# Patient Record
Sex: FEMALE | Race: WHITE | Employment: OTHER | URBAN - METROPOLITAN AREA
[De-identification: names, ages, dates, MRNs, and addresses within clinical notes are randomized per-mention and may not be internally consistent; named-entity substitution may affect disease eponyms.]

---

## 2019-06-16 ENCOUNTER — APPOINTMENT (OUTPATIENT)
Dept: GENERAL RADIOLOGY | Age: 74
DRG: 811 | End: 2019-06-16
Payer: MEDICARE

## 2019-06-16 ENCOUNTER — HOSPITAL ENCOUNTER (INPATIENT)
Age: 74
LOS: 4 days | Discharge: HOME | DRG: 811 | End: 2019-06-20
Attending: EMERGENCY MEDICINE | Admitting: INTERNAL MEDICINE
Payer: MEDICARE

## 2019-06-16 DIAGNOSIS — D62 ACUTE BLOOD LOSS ANEMIA: Primary | ICD-10-CM

## 2019-06-16 DIAGNOSIS — I50.9 ACUTE CONGESTIVE HEART FAILURE, UNSPECIFIED HEART FAILURE TYPE (HCC): ICD-10-CM

## 2019-06-16 PROBLEM — D64.9 ANEMIA: Status: ACTIVE | Noted: 2019-06-16

## 2019-06-16 LAB
ABO/RH: NORMAL
ANION GAP SERPL CALCULATED.3IONS-SCNC: 16 MMOL/L (ref 3–16)
ANISOCYTOSIS: ABNORMAL
ANTIBODY SCREEN: NORMAL
ATYPICAL LYMPHOCYTE RELATIVE PERCENT: 1 % (ref 0–6)
BASOPHILS ABSOLUTE: 0 K/UL (ref 0–0.2)
BASOPHILS RELATIVE PERCENT: 0 %
BLOOD BANK DISPENSE STATUS: NORMAL
BLOOD BANK PRODUCT CODE: NORMAL
BPU ID: NORMAL
BUN BLDV-MCNC: 7 MG/DL (ref 7–20)
CALCIUM SERPL-MCNC: 8.1 MG/DL (ref 8.3–10.6)
CHLORIDE BLD-SCNC: 100 MMOL/L (ref 99–110)
CO2: 23 MMOL/L (ref 21–32)
CREAT SERPL-MCNC: 0.5 MG/DL (ref 0.6–1.2)
DESCRIPTION BLOOD BANK: NORMAL
EOSINOPHILS ABSOLUTE: 0.1 K/UL (ref 0–0.6)
EOSINOPHILS RELATIVE PERCENT: 1 %
GFR AFRICAN AMERICAN: >60
GFR NON-AFRICAN AMERICAN: >60
GLUCOSE BLD-MCNC: 94 MG/DL (ref 70–99)
HCT VFR BLD CALC: 21.4 % (ref 36–48)
HEMOGLOBIN: 6.7 G/DL (ref 12–16)
LYMPHOCYTES ABSOLUTE: 1.3 K/UL (ref 1–5.1)
LYMPHOCYTES RELATIVE PERCENT: 15 %
MCH RBC QN AUTO: 29.8 PG (ref 26–34)
MCHC RBC AUTO-ENTMCNC: 31.1 G/DL (ref 31–36)
MCV RBC AUTO: 95.7 FL (ref 80–100)
MONOCYTES ABSOLUTE: 0.3 K/UL (ref 0–1.3)
MONOCYTES RELATIVE PERCENT: 4 %
NEUTROPHILS ABSOLUTE: 6.2 K/UL (ref 1.7–7.7)
NEUTROPHILS RELATIVE PERCENT: 79 %
PDW BLD-RTO: 18.9 % (ref 12.4–15.4)
PLATELET # BLD: 519 K/UL (ref 135–450)
PLATELET SLIDE REVIEW: ABNORMAL
PMV BLD AUTO: 7.7 FL (ref 5–10.5)
POLYCHROMASIA: ABNORMAL
POTASSIUM SERPL-SCNC: 4.4 MMOL/L (ref 3.5–5.1)
PRO-BNP: 1176 PG/ML (ref 0–124)
RBC # BLD: 2.23 M/UL (ref 4–5.2)
SODIUM BLD-SCNC: 139 MMOL/L (ref 136–145)
STOMATOCYTES: ABNORMAL
TARGET CELLS: ABNORMAL
TROPONIN: <0.01 NG/ML
WBC # BLD: 7.9 K/UL (ref 4–11)

## 2019-06-16 PROCEDURE — 99285 EMERGENCY DEPT VISIT HI MDM: CPT

## 2019-06-16 PROCEDURE — 93005 ELECTROCARDIOGRAM TRACING: CPT | Performed by: EMERGENCY MEDICINE

## 2019-06-16 PROCEDURE — 71046 X-RAY EXAM CHEST 2 VIEWS: CPT

## 2019-06-16 PROCEDURE — 85025 COMPLETE CBC W/AUTO DIFF WBC: CPT

## 2019-06-16 PROCEDURE — 86923 COMPATIBILITY TEST ELECTRIC: CPT

## 2019-06-16 PROCEDURE — 83540 ASSAY OF IRON: CPT

## 2019-06-16 PROCEDURE — 36430 TRANSFUSION BLD/BLD COMPNT: CPT

## 2019-06-16 PROCEDURE — 6370000000 HC RX 637 (ALT 250 FOR IP): Performed by: STUDENT IN AN ORGANIZED HEALTH CARE EDUCATION/TRAINING PROGRAM

## 2019-06-16 PROCEDURE — 6360000002 HC RX W HCPCS: Performed by: STUDENT IN AN ORGANIZED HEALTH CARE EDUCATION/TRAINING PROGRAM

## 2019-06-16 PROCEDURE — 1200000000 HC SEMI PRIVATE

## 2019-06-16 PROCEDURE — C9113 INJ PANTOPRAZOLE SODIUM, VIA: HCPCS | Performed by: STUDENT IN AN ORGANIZED HEALTH CARE EDUCATION/TRAINING PROGRAM

## 2019-06-16 PROCEDURE — 83880 ASSAY OF NATRIURETIC PEPTIDE: CPT

## 2019-06-16 PROCEDURE — 84443 ASSAY THYROID STIM HORMONE: CPT

## 2019-06-16 PROCEDURE — G0328 FECAL BLOOD SCRN IMMUNOASSAY: HCPCS

## 2019-06-16 PROCEDURE — 86900 BLOOD TYPING SEROLOGIC ABO: CPT

## 2019-06-16 PROCEDURE — P9016 RBC LEUKOCYTES REDUCED: HCPCS

## 2019-06-16 PROCEDURE — 2580000003 HC RX 258: Performed by: STUDENT IN AN ORGANIZED HEALTH CARE EDUCATION/TRAINING PROGRAM

## 2019-06-16 PROCEDURE — 80048 BASIC METABOLIC PNL TOTAL CA: CPT

## 2019-06-16 PROCEDURE — 84484 ASSAY OF TROPONIN QUANT: CPT

## 2019-06-16 PROCEDURE — 86901 BLOOD TYPING SEROLOGIC RH(D): CPT

## 2019-06-16 PROCEDURE — 36415 COLL VENOUS BLD VENIPUNCTURE: CPT

## 2019-06-16 PROCEDURE — 86850 RBC ANTIBODY SCREEN: CPT

## 2019-06-16 PROCEDURE — 83550 IRON BINDING TEST: CPT

## 2019-06-16 RX ORDER — LEVOTHYROXINE SODIUM 137 UG/1
137 TABLET ORAL DAILY
COMMUNITY

## 2019-06-16 RX ORDER — PANTOPRAZOLE SODIUM 40 MG/1
40 TABLET, DELAYED RELEASE ORAL DAILY
COMMUNITY

## 2019-06-16 RX ORDER — DICYCLOMINE HCL 20 MG
20 TABLET ORAL EVERY 6 HOURS PRN
Status: DISCONTINUED | OUTPATIENT
Start: 2019-06-16 | End: 2019-06-20 | Stop reason: HOSPADM

## 2019-06-16 RX ORDER — CITALOPRAM 20 MG/1
20 TABLET ORAL DAILY
Status: DISCONTINUED | OUTPATIENT
Start: 2019-06-17 | End: 2019-06-20 | Stop reason: HOSPADM

## 2019-06-16 RX ORDER — SIMVASTATIN 20 MG
20 TABLET ORAL NIGHTLY
Status: DISCONTINUED | OUTPATIENT
Start: 2019-06-16 | End: 2019-06-20 | Stop reason: HOSPADM

## 2019-06-16 RX ORDER — SODIUM CHLORIDE 0.9 % (FLUSH) 0.9 %
10 SYRINGE (ML) INJECTION EVERY 12 HOURS SCHEDULED
Status: DISCONTINUED | OUTPATIENT
Start: 2019-06-16 | End: 2019-06-20 | Stop reason: HOSPADM

## 2019-06-16 RX ORDER — CITALOPRAM 20 MG/1
20 TABLET ORAL DAILY
COMMUNITY

## 2019-06-16 RX ORDER — 0.9 % SODIUM CHLORIDE 0.9 %
250 INTRAVENOUS SOLUTION INTRAVENOUS ONCE
Status: DISCONTINUED | OUTPATIENT
Start: 2019-06-16 | End: 2019-06-18

## 2019-06-16 RX ORDER — ALPRAZOLAM 0.5 MG/1
0.5 TABLET ORAL NIGHTLY PRN
Status: DISCONTINUED | OUTPATIENT
Start: 2019-06-16 | End: 2019-06-20 | Stop reason: HOSPADM

## 2019-06-16 RX ORDER — LEVOTHYROXINE SODIUM 137 UG/1
137 TABLET ORAL DAILY
Status: DISCONTINUED | OUTPATIENT
Start: 2019-06-17 | End: 2019-06-20 | Stop reason: HOSPADM

## 2019-06-16 RX ORDER — ACETAMINOPHEN 325 MG/1
650 TABLET ORAL EVERY 4 HOURS PRN
Status: DISCONTINUED | OUTPATIENT
Start: 2019-06-16 | End: 2019-06-20 | Stop reason: HOSPADM

## 2019-06-16 RX ORDER — DICYCLOMINE HCL 20 MG
20 TABLET ORAL
COMMUNITY

## 2019-06-16 RX ORDER — SODIUM CHLORIDE 0.9 % (FLUSH) 0.9 %
10 SYRINGE (ML) INJECTION PRN
Status: DISCONTINUED | OUTPATIENT
Start: 2019-06-16 | End: 2019-06-20 | Stop reason: HOSPADM

## 2019-06-16 RX ORDER — SIMVASTATIN 20 MG
20 TABLET ORAL NIGHTLY
COMMUNITY

## 2019-06-16 RX ORDER — ALPRAZOLAM 0.5 MG/1
0.5 TABLET ORAL NIGHTLY PRN
Status: ON HOLD | COMMUNITY
End: 2021-10-11 | Stop reason: SDUPTHER

## 2019-06-16 RX ORDER — ONDANSETRON 2 MG/ML
4 INJECTION INTRAMUSCULAR; INTRAVENOUS EVERY 6 HOURS PRN
Status: DISCONTINUED | OUTPATIENT
Start: 2019-06-16 | End: 2019-06-20 | Stop reason: HOSPADM

## 2019-06-16 RX ADMIN — ALPRAZOLAM 0.5 MG: 0.5 TABLET ORAL at 23:46

## 2019-06-16 RX ADMIN — SIMVASTATIN 20 MG: 20 TABLET, FILM COATED ORAL at 22:16

## 2019-06-16 RX ADMIN — PANTOPRAZOLE SODIUM 8 MG/HR: 40 INJECTION, POWDER, FOR SOLUTION INTRAVENOUS at 22:16

## 2019-06-16 ASSESSMENT — ENCOUNTER SYMPTOMS
ABDOMINAL PAIN: 0
VOMITING: 0
NAUSEA: 0
SHORTNESS OF BREATH: 1
BLOOD IN STOOL: 1

## 2019-06-16 ASSESSMENT — PAIN SCALES - GENERAL: PAINLEVEL_OUTOF10: 0

## 2019-06-16 NOTE — ED NOTES
Bed: B19-19  Expected date:   Expected time:   Means of arrival:   Comments:  Fiordaliza Arredondo RN  06/16/19 1002

## 2019-06-16 NOTE — ED PROVIDER NOTES
[address] ENCOUNTER          ATTENDING PHYSICIAN NOTE     Date of evaluation: 6/16/2019    Chief Complaint     No chief complaint on file. History of Present Illness     Mimi Harper is a 68 y.o. female with a history of DM2, IPF, and recent LGIB who presents with dyspnea and weakness. Onset several weeks ago. Tired with minimal exertion. Had large volume bloody BMs with clots, colonoscopy Thursday with unknown results. BRBPR since resolved. Also with increased BLE swelling. Denies fever, chills, chest pain, abdominal pain, nausea, vomiting, melena, urinary symptoms, or other complaints. Review of Systems     Review of Systems   Constitutional: Negative for chills and fever. HENT: Negative for congestion and rhinorrhea. Respiratory: Positive for shortness of breath. Negative for cough. Cardiovascular: Negative for chest pain and palpitations. Gastrointestinal: Positive for blood in stool. Negative for abdominal pain, diarrhea, nausea and vomiting. Genitourinary: Negative for dysuria, frequency and urgency. Musculoskeletal: Negative for back pain and neck pain. Skin: Negative for rash and wound. Neurological: Positive for weakness. Negative for syncope and headaches. Psychiatric/Behavioral: Negative for agitation and confusion. Past Medical, Surgical, Family, and Social History     She has a past medical history of Diabetes mellitus ([facility] [address] Hyperlipidemia, Pulmonary fibrosis ([address] and Thyroid disease. She has a past surgical history that includes Upper gastrointestinal endoscopy and Colonoscopy. Her family history is not on file. She reports that she has never smoked. She has never used smokeless tobacco. She reports that she drinks alcohol. She reports that she does not use drugs. Medications     Previous Medications    ALPRAZOLAM (XANAX) 0.5 MG TABLET    Take 0.5 mg by mouth nightly as needed for Sleep.     ASPIRIN 81 MG TABLET    Take Results for orders placed or performed during the hospital encounter of 06/16/19   CBC Auto Differential   Result Value Ref Range    WBC 7.9 4.0 - 11.0 K/uL    RBC 2.23 (L) 4.00 - 5.20 M/uL    Hemoglobin 6.7 (LL) 12.0 - 16.0 g/dL    Hematocrit 21.4 (L) 36.0 - 48.0 %    MCV 95.7 80.0 - 100.0 fL    MCH 29.8 26.0 - 34.0 pg    MCHC 31.1 31.0 - 36.0 g/dL    RDW 18.9 (H) 12.4 - 15.4 %    Platelets 947 (H) 514 - 450 K/uL    MPV 7.7 5.0 - 10.5 fL    PLATELET SLIDE REVIEW Increased     Neutrophils % 79.0 %    Lymphocytes % 15.0 %    Monocytes % 4.0 %    Eosinophils % 1.0 %    Basophils % 0.0 %    Neutrophils # 6.2 1.7 - 7.7 K/uL    Lymphocytes # 1.3 1.0 - 5.1 K/uL    Monocytes # 0.3 0.0 - 1.3 K/uL    Eosinophils # 0.1 0.0 - 0.6 K/uL    Basophils # 0.0 0.0 - 0.2 K/uL    Atypical Lymphocytes Relative 1 0 - 6 %    Anisocytosis Occasional (A)     Polychromasia Occasional (A)     Stomatocytes Occasional (A)     Target Cells Occasional (A)    Basic Metabolic Panel   Result Value Ref Range    Sodium 139 136 - 145 mmol/L    Potassium 4.4 3.5 - 5.1 mmol/L    Chloride 100 99 - 110 mmol/L    CO2 23 21 - 32 mmol/L    Anion Gap 16 3 - 16    Glucose 94 70 - 99 mg/dL    BUN 7 7 - 20 mg/dL    CREATININE 0.5 (L) 0.6 - 1.2 mg/dL    GFR Non-African American >60 >60    GFR African American >60 >60    Calcium 8.1 (L) 8.3 - 10.6 mg/dL   Troponin   Result Value Ref Range    Troponin <0.01 <0.01 ng/mL   Brain Natriuretic Peptide   Result Value Ref Range    Pro-BNP 1,176 (H) 0 - 124 pg/mL   TYPE AND SCREEN   Result Value Ref Range    ABO/Rh B POS     Antibody Screen NEG    PREPARE RBC (CROSSMATCH), 1 Units   Result Value Ref Range    Product Code Blood Bank [id number]     Description Blood Bank Red Blood Cells, Leuko-reduced     Unit Number [id number]     Dispense Status Blood Bank issued        ED BEDSIDE ULTRASOUND:  N/A    RECENT VITALS:  BP: (!) 129/56, Temp: 98.1 °F (36.7 °C), Pulse: 79, Resp: 18, SpO2: 98 %     Procedures     N/A    MEDICAL DECISION MAKING / ASSESSMENT / Faridajossy Thorne is a 68 y.o. female with a history of DM2, IPF, and recent LGIB who presents with dyspnea and weakness. Symptoms and exam most consistent with symptomatic anemia 2/2 GIB. Other considerations include ADHF. Doubt ACS. Doubt PE. Doubt other emergent condition. Plan: ECG, labs as below, possible transfusion    ED Course     Nursing Notes, Past Medical Hx, Past Surgical Hx, Social Hx, Allergies, and Family Hx were reviewed. The patient was given the followingmedications:  Orders Placed This Encounter   Medications    0.9 % sodium chloride bolus       CONSULTS:  IP CONSULT TO HOSPITALIST    ED Course as of Jun 16 2029   Sun Jun 16, 2019   1913 NSR at 79bpm, no ST/T changes   EKG 12 Lead [AZ]   1928 Will transfuse   Hemoglobin Quant(!!): 6.7 [AZ]   1929 Small left pleural effusion. No acute pulmonary consolidation. XR CHEST STANDARD (2 VW) [AZ]   1940 BMP unremarkable    [AZ]   1940 Neg   Troponin: <0.01 [AZ]   1940 Elevated   Pro-BNP(!): 1,176 [AZ]   1959 Refusing rectal exam, will attempt to stool    [AZ]   2029 Suspect ABLA 2/2 LGIB and possible new HF. Admitted to medicine for further work-up and management.    [AZ]      ED Course User Index  [AZ] Casimiro Daley MD       Clinical Impression     1. Acute blood loss anemia    2. Acute congestive heart failure, unspecified heart failure type ([facility] [address]        Disposition     PATIENT REFERRED TO:  No follow-up provider specified.     DISCHARGEMEDICATIONS:  New Prescriptions    No medications on file       DISPOSITION       Casimiro Daley MD  06/16/19 2029

## 2019-06-16 NOTE — ED TRIAGE NOTES
Pt arrives with complaints of SOB for the last few weeks. States she is becoming increasingly short of breath with activity.

## 2019-06-16 NOTE — LETTER
Beneficiary Notification Letter     This [facility] Provider is Participating in an Innovative Payment and [address] from [employer] Palacios: [facility] is participating in a Medicare initiative called the [facility] for [address]. You are receiving this letter because your health care provider has identified you as a patient who is receiving care through this initiative. Health care providers participating in the [facility] [address], including [facility], will work with Medicare to improve care for patients. Your Medicare rights have not been changed. You still have all the same Medicare rights and protections, including the right to choose which hospital, doctor, or other health care provider you see. However, because [facility] [address] chose to participate in the [facility] [address], all Medicare beneficiaries who meet the eligibility criteria of this initiative will receive care under the initiative. If you do not wish to receive care under the [employer] [facility] [address], you must choose a health care provider that does not participate in this initiative for your care. Regardless of which health care provider you see, Medicare will continue to cover all of your medically necessary services. Bundled Payments for Care Improvement Advanced aims to help improve your care     The Bundled Payments [facility] [address] is an innovative Medicare initiative that encourages your doctors, hospitals, and other health care providers to work more closely together so you get better care during and following certain hospital stays.  In this initiative, doctors and hospitals may work closely with certain health care providers and 1-800- MEDICARE (7-526.922.1063). TTY users should call 4-549.816.1653. · To find a different doctor, visit Medicare's Physician Compare website, [employer], or call 1-800-MEDICARE (126 1952). TTY users should call 6-247.863.2612. · To find a different skilled nursing facility, visit [employer] website, https://www.[employer]/, or call 1-800-MEDICARE (1- 374.448.6036). TTY users should call 5-124.487.4328. · To find a different long term care hospital, visit [facility] [address] Compare website, [employer], or call 1-800- MEDICARE (903 9721). TTY users should call 0-537.533.4765. · To find a different inpatient rehabilitation facility, visit [facility] [address] Compare website, www.medicare.gov/ inpatientrehabilitation facilitycompare, or call 1-800-MEDICARE (4-213.899.7434). TTY users should call 2- 629.287.7435. · To find a different home health agency, visit [address] website, www.medicare.gov/homehealthcompare, or call 1-800-MEDICARE (5-713- 854-8616). TTY users should call 3-945.134.1424.

## 2019-06-17 LAB
ANION GAP SERPL CALCULATED.3IONS-SCNC: 12 MMOL/L (ref 3–16)
BASOPHILS ABSOLUTE: 0.1 K/UL (ref 0–0.2)
BASOPHILS RELATIVE PERCENT: 1.2 %
BLOOD BANK DISPENSE STATUS: NORMAL
BLOOD BANK PRODUCT CODE: NORMAL
BPU ID: NORMAL
BUN BLDV-MCNC: 7 MG/DL (ref 7–20)
CALCIUM SERPL-MCNC: 7.8 MG/DL (ref 8.3–10.6)
CHLORIDE BLD-SCNC: 104 MMOL/L (ref 99–110)
CO2: 21 MMOL/L (ref 21–32)
CREAT SERPL-MCNC: 0.5 MG/DL (ref 0.6–1.2)
DESCRIPTION BLOOD BANK: NORMAL
EKG ATRIAL RATE: 79 BPM
EKG DIAGNOSIS: NORMAL
EKG P AXIS: 26 DEGREES
EKG P-R INTERVAL: 146 MS
EKG Q-T INTERVAL: 394 MS
EKG QRS DURATION: 68 MS
EKG QTC CALCULATION (BAZETT): 451 MS
EKG R AXIS: 26 DEGREES
EKG T AXIS: 0 DEGREES
EKG VENTRICULAR RATE: 79 BPM
EOSINOPHILS ABSOLUTE: 0.2 K/UL (ref 0–0.6)
EOSINOPHILS RELATIVE PERCENT: 2 %
FERRITIN: 24.9 NG/ML (ref 15–150)
GFR AFRICAN AMERICAN: >60
GFR NON-AFRICAN AMERICAN: >60
GLUCOSE BLD-MCNC: 102 MG/DL (ref 70–99)
GLUCOSE BLD-MCNC: 114 MG/DL (ref 70–99)
GLUCOSE BLD-MCNC: 163 MG/DL (ref 70–99)
GLUCOSE BLD-MCNC: 90 MG/DL (ref 70–99)
GLUCOSE BLD-MCNC: 92 MG/DL (ref 70–99)
HCT VFR BLD CALC: 20.3 % (ref 36–48)
HCT VFR BLD CALC: 27.3 % (ref 36–48)
HEMOGLOBIN: 6.6 G/DL (ref 12–16)
HEMOGLOBIN: 8.9 G/DL (ref 12–16)
LV EF: 58 %
LVEF MODALITY: NORMAL
LYMPHOCYTES ABSOLUTE: 1.1 K/UL (ref 1–5.1)
LYMPHOCYTES RELATIVE PERCENT: 11.6 %
MCH RBC QN AUTO: 29.5 PG (ref 26–34)
MCHC RBC AUTO-ENTMCNC: 32.5 G/DL (ref 31–36)
MCV RBC AUTO: 90.6 FL (ref 80–100)
MONOCYTES ABSOLUTE: 0.9 K/UL (ref 0–1.3)
MONOCYTES RELATIVE PERCENT: 9.3 %
NEUTROPHILS ABSOLUTE: 7 K/UL (ref 1.7–7.7)
NEUTROPHILS RELATIVE PERCENT: 75.9 %
OCCULT BLOOD SCREENING: NORMAL
PDW BLD-RTO: 21.2 % (ref 12.4–15.4)
PERFORMED ON: ABNORMAL
PERFORMED ON: NORMAL
PLATELET # BLD: 481 K/UL (ref 135–450)
PMV BLD AUTO: 7.6 FL (ref 5–10.5)
POTASSIUM REFLEX MAGNESIUM: 4.6 MMOL/L (ref 3.5–5.1)
RBC # BLD: 3.01 M/UL (ref 4–5.2)
SODIUM BLD-SCNC: 137 MMOL/L (ref 136–145)
WBC # BLD: 9.3 K/UL (ref 4–11)

## 2019-06-17 PROCEDURE — 85014 HEMATOCRIT: CPT

## 2019-06-17 PROCEDURE — 1200000000 HC SEMI PRIVATE

## 2019-06-17 PROCEDURE — C9113 INJ PANTOPRAZOLE SODIUM, VIA: HCPCS | Performed by: STUDENT IN AN ORGANIZED HEALTH CARE EDUCATION/TRAINING PROGRAM

## 2019-06-17 PROCEDURE — 85025 COMPLETE CBC W/AUTO DIFF WBC: CPT

## 2019-06-17 PROCEDURE — 85018 HEMOGLOBIN: CPT

## 2019-06-17 PROCEDURE — 6360000002 HC RX W HCPCS: Performed by: INTERNAL MEDICINE

## 2019-06-17 PROCEDURE — 6360000002 HC RX W HCPCS: Performed by: STUDENT IN AN ORGANIZED HEALTH CARE EDUCATION/TRAINING PROGRAM

## 2019-06-17 PROCEDURE — 80048 BASIC METABOLIC PNL TOTAL CA: CPT

## 2019-06-17 PROCEDURE — 36415 COLL VENOUS BLD VENIPUNCTURE: CPT

## 2019-06-17 PROCEDURE — 93306 TTE W/DOPPLER COMPLETE: CPT

## 2019-06-17 PROCEDURE — P9016 RBC LEUKOCYTES REDUCED: HCPCS

## 2019-06-17 PROCEDURE — 6370000000 HC RX 637 (ALT 250 FOR IP): Performed by: STUDENT IN AN ORGANIZED HEALTH CARE EDUCATION/TRAINING PROGRAM

## 2019-06-17 PROCEDURE — 2580000003 HC RX 258: Performed by: STUDENT IN AN ORGANIZED HEALTH CARE EDUCATION/TRAINING PROGRAM

## 2019-06-17 PROCEDURE — 82728 ASSAY OF FERRITIN: CPT

## 2019-06-17 PROCEDURE — 86923 COMPATIBILITY TEST ELECTRIC: CPT

## 2019-06-17 PROCEDURE — 6370000000 HC RX 637 (ALT 250 FOR IP): Performed by: INTERNAL MEDICINE

## 2019-06-17 RX ORDER — NICOTINE POLACRILEX 4 MG
15 LOZENGE BUCCAL PRN
Status: DISCONTINUED | OUTPATIENT
Start: 2019-06-17 | End: 2019-06-20 | Stop reason: HOSPADM

## 2019-06-17 RX ORDER — FUROSEMIDE 10 MG/ML
20 INJECTION INTRAMUSCULAR; INTRAVENOUS ONCE
Status: COMPLETED | OUTPATIENT
Start: 2019-06-17 | End: 2019-06-17

## 2019-06-17 RX ORDER — DEXTROSE MONOHYDRATE 50 MG/ML
100 INJECTION, SOLUTION INTRAVENOUS PRN
Status: DISCONTINUED | OUTPATIENT
Start: 2019-06-17 | End: 2019-06-20 | Stop reason: HOSPADM

## 2019-06-17 RX ORDER — PANTOPRAZOLE SODIUM 40 MG/1
40 TABLET, DELAYED RELEASE ORAL
Status: DISCONTINUED | OUTPATIENT
Start: 2019-06-17 | End: 2019-06-20 | Stop reason: HOSPADM

## 2019-06-17 RX ORDER — LIDOCAINE 4 G/G
1 PATCH TOPICAL DAILY
Status: DISCONTINUED | OUTPATIENT
Start: 2019-06-17 | End: 2019-06-20 | Stop reason: HOSPADM

## 2019-06-17 RX ORDER — DEXTROSE MONOHYDRATE 25 G/50ML
12.5 INJECTION, SOLUTION INTRAVENOUS PRN
Status: DISCONTINUED | OUTPATIENT
Start: 2019-06-17 | End: 2019-06-20 | Stop reason: HOSPADM

## 2019-06-17 RX ADMIN — PANTOPRAZOLE SODIUM 40 MG: 40 TABLET, DELAYED RELEASE ORAL at 16:22

## 2019-06-17 RX ADMIN — PANTOPRAZOLE SODIUM 8 MG/HR: 40 INJECTION, POWDER, FOR SOLUTION INTRAVENOUS at 05:10

## 2019-06-17 RX ADMIN — Medication 10 ML: at 20:41

## 2019-06-17 RX ADMIN — ALPRAZOLAM 0.5 MG: 0.5 TABLET ORAL at 22:39

## 2019-06-17 RX ADMIN — Medication 10 ML: at 08:11

## 2019-06-17 RX ADMIN — FUROSEMIDE 20 MG: 10 INJECTION, SOLUTION INTRAMUSCULAR; INTRAVENOUS at 16:13

## 2019-06-17 RX ADMIN — LEVOTHYROXINE SODIUM 137 MCG: 137 TABLET ORAL at 05:04

## 2019-06-17 RX ADMIN — SIMVASTATIN 20 MG: 20 TABLET, FILM COATED ORAL at 20:40

## 2019-06-17 RX ADMIN — ACETAMINOPHEN 650 MG: 325 TABLET ORAL at 08:10

## 2019-06-17 RX ADMIN — CITALOPRAM HYDROBROMIDE 20 MG: 20 TABLET ORAL at 08:10

## 2019-06-17 ASSESSMENT — PAIN SCALES - GENERAL
PAINLEVEL_OUTOF10: 0
PAINLEVEL_OUTOF10: 3
PAINLEVEL_OUTOF10: 5
PAINLEVEL_OUTOF10: 0
PAINLEVEL_OUTOF10: 0

## 2019-06-17 ASSESSMENT — PAIN DESCRIPTION - PROGRESSION: CLINICAL_PROGRESSION: GRADUALLY WORSENING

## 2019-06-17 ASSESSMENT — PAIN DESCRIPTION - DESCRIPTORS: DESCRIPTORS: ACHING

## 2019-06-17 ASSESSMENT — PAIN DESCRIPTION - LOCATION: LOCATION: NECK

## 2019-06-17 ASSESSMENT — ENCOUNTER SYMPTOMS
GASTROINTESTINAL NEGATIVE: 1
SHORTNESS OF BREATH: 1

## 2019-06-17 ASSESSMENT — PAIN DESCRIPTION - ONSET: ONSET: ON-GOING

## 2019-06-17 ASSESSMENT — PAIN DESCRIPTION - PAIN TYPE: TYPE: ACUTE PAIN

## 2019-06-17 ASSESSMENT — PAIN DESCRIPTION - ORIENTATION: ORIENTATION: RIGHT

## 2019-06-17 ASSESSMENT — PAIN DESCRIPTION - FREQUENCY: FREQUENCY: CONTINUOUS

## 2019-06-17 NOTE — H&P
Internal Medicine  PGY 1  History & Physical      CC SOB, leg swelling     History Obtained From:  patient    HISTORY OF PRESENT ILLNESS:  Ms. Alinda Barthel is a 67 yo F with PMHx significant for recent LGIB, DM, HLD, HFrEF (ECHO 8/2018: EF 65-70%, G1DD), and hypothyroidism who presents to the ED complaining of SOB over the last several weeks which has progressively gotten worse over the last couple of days. Patient reports she has has a history of pulmonary fibrosis and is supposed to be on 2.5L at night, but reports that \"the machine hasn't been working right so I haven't been using it for a while. \" She states that getting up out of bed and ambulating across the room leads to severe shortness of breath. She now has to take frequent breaks to catch her breath while doing simple everyday activities around the house. She denies any orthopnea or PND. Additionally, she complains of lower extremity swelling which is chronic in nature, but has progressively gotten worse over the last 2 weeks. She reports that her podiatrist told her that the swelling was 2/2 diabetes. She denies CP, abdominal pain, nausea/vomiting, cough, fevers/chills. Interestingly, patient reports that she was experiencing BRBPR with dark clots from June 4th-June 8th. She has never experienced anything similar in the past. She reports that she had the urge to have a bowel movement but passed blood clots instead. She reported several more instances of rectal bleeding with clots which persisted for approximately 4 days. These episodes were associated with dizziness and light-headedness. Patient underwent c-scope and EGD on 6/13 which revealed 2 colon polyps, internal hemorrhoids, high grade esophogeal stricture and possible eosinophilic esophagitis. No obvious source of bleeding noted. Patient states that she has has no further episodes of BRBPR. She does report unintentional weight loss x 6 months, poor appetite and fatigue.  She has chronic dysphagia which is at baseline. No night sweats or recent travel. While in the ED, CBC showed a hemoglobin of 6.7 (down from 10.2 on June 6), Hct 21.4, BMP unremarkable. Troponin negative. BNP > 1100. CXR small left pleural effusion. Patient was typed and screened and given 1unit pRBCs. Past Medical History:        Diagnosis Date    Diabetes mellitus ([facility] [address]     Hyperlipidemia     Pulmonary fibrosis ([facility] [address]     Thyroid disease    ·     Past Surgical History:        Procedure Laterality Date    COLONOSCOPY      UPPER GASTROINTESTINAL ENDOSCOPY     ·     Medications Priorto Admission:    · Medications Prior to Admission: ALPRAZolam (XANAX) 0.5 MG tablet, Take 0.5 mg by mouth nightly as needed for Sleep. · aspirin 81 MG tablet, Take 81 mg by mouth daily  · dicyclomine (BENTYL) 20 MG tablet, Take 20 mg by mouth every 4-6 hours as needed  · citalopram (CELEXA) 20 MG tablet, Take 20 mg by mouth daily  · levothyroxine (SYNTHROID) 137 MCG tablet, Take 137 mcg by mouth Daily  · metFORMIN (GLUCOPHAGE) 1000 MG tablet, Take 1,000 mg by mouth 2 times daily (with meals)  · pantoprazole (PROTONIX) 40 MG tablet, Take 40 mg by mouth daily  · simvastatin (ZOCOR) 20 MG tablet, Take 20 mg by mouth nightly  · Ergocalciferol (VITAMIN D2 PO), Take 50,000 Units by mouth once a week    Allergies:  Lisinopril and Pcn [penicillins]    Social History:   · TOBACCO:   reports that she has never smoked. She has never used smokeless tobacco.  · ETOH:   reports that she drinks alcohol. · DRUGS : denied  · Patient currently lives at home   ·   Family History:   · History reviewed. No pertinent family history. Review of Systems   Constitutional: Positive for unexpected weight change. HENT: Negative. Respiratory: Positive for shortness of breath. Cardiovascular: Positive for leg swelling. Gastrointestinal: Positive for blood in stool. Negative for abdominal pain, nausea and vomiting. Genitourinary: Negative.     Musculoskeletal: Negative. Neurological: Positive for dizziness and light-headedness. ROS: A 10 point review of systems was conducted, significant findings as noted in HPI. Physical Exam   Constitutional: She is oriented to person, place, and time. She appears well-developed and well-nourished. No distress. HENT:   Head: Normocephalic. Eyes: Pupils are equal, round, and reactive to light. EOM are normal.   Neck: Normal range of motion. Cardiovascular: Normal rate and regular rhythm. Pulmonary/Chest: Effort normal.   Crackles heard within mid-lower lung bases . Abdominal: Soft. Bowel sounds are normal. She exhibits no distension. There is no tenderness. There is no guarding. Genitourinary:   Genitourinary Comments: Deferred ROSAS at this time. Musculoskeletal: Normal range of motion. She exhibits edema. 1+ pitting edema of bilateral lower extremities up to upper shins. Neurological: She is alert and oriented to person, place, and time. Skin: Skin is warm and dry. Psychiatric: She has a normal mood and affect. Physical exam:       Vitals:    06/16/19 2121   BP: 129/66   Pulse: 69   Resp: 18   Temp: 98.9 °F (37.2 °C)   SpO2: 94%       DATA:    Labs:  CBC:   Recent Labs     06/16/19 1856   WBC 7.9   HGB 6.7*   HCT 21.4*   *       BMP:   Recent Labs     06/16/19 1856      K 4.4      CO2 23   BUN 7   CREATININE 0.5*   GLUCOSE 94     LFT's: No results for input(s): AST, ALT, ALB, BILITOT, ALKPHOS in the last 72 hours. Troponin:   Recent Labs     06/16/19 1856   TROPONINI <0.01     BNP:No results for input(s): BNP in the last 72 hours. ABGs: No results for input(s): PHART, [id number], PO2ART in the last 72 hours. INR: No results for input(s): INR in the last 72 hours.     U/A:No results for input(s): NITRITE, COLORU, PHUR, LABCAST, WBCUA, RBCUA, MUCUS, TRICHOMONAS, YEAST, BACTERIA, CLARITYU, SPECGRAV, LEUKOCYTESUR, UROBILINOGEN, BILIRUBINUR, BLOODU, GLUCOSEU, AMORPHOUS in the last home statin     Anxiety, sleep  - Cont home xanax nightly        Will discuss with attending physician Dr. Una Connelly Status:Full code  FEN: carb control, NPO at midnight   PPX: Holding AC/AP for possible GIB  DISPO: JENNIFER Owens MD  6/16/2019,  9:27 PM

## 2019-06-17 NOTE — CONSULTS
Clinical Pharmacy Progress Note    Admit date: 6/16/2019     Pharmacy has been asked by Dr. Claudio Alanis to assist with obtaining home medication information. Assessment/Plan:    1. Home Medication information:    · Home Medication List in Epic is complete. Source of information is patient interview. · No changes made to home medication list completed by RN upon admission.  Patient does tell me that she was recently transitioned from imipramine to citalopram, which is reflected in home medication list.     Please call with questions  Thank you for consulting pharmacy --   Nanette Chapman PharmD, BCPS  6/17/2019 5:04 PM  Wireless: [id number]

## 2019-06-17 NOTE — PROGRESS NOTES
Progress Note    Admit Date: 6/16/2019  Day: 1  Diet: Diet NPO, After Midnight Exceptions are: Ice Chips    CC: SOB, leg swelling     Interval history: Post transfusion H&H 6.6 (was 6.7 on admission). Transfused an additional unit of pRBCs. Awaiting post-transfusion H&H. Patient reports that she is feeling better this morning and does not feel as winded with ambulation. Denies CP, abdominal pain, nausea/vomiting. No blood in stool. HPI: Ms. Tung Valderrama is a 67 yo F with PMHx significant for recent LGIB, DM, HLD, HFrEF (ECHO 8/2018: EF 65-70%, G1DD), and hypothyroidism who presents to the ED complaining of SOB.  Hemoglobin 6.7 in setting of recent GIB.            Medications:     Scheduled Meds:   sodium chloride  250 mL Intravenous Once    citalopram  20 mg Oral Daily    levothyroxine  137 mcg Oral Daily    simvastatin  20 mg Oral Nightly    sodium chloride flush  10 mL Intravenous 2 times per day     Continuous Infusions:   pantoprozole (PROTONIX) infusion 8 mg/hr (06/16/19 2216)     PRN Meds:ALPRAZolam, dicyclomine, sodium chloride flush, acetaminophen, ondansetron    Objective:   Vitals:   T-max:  Patient Vitals for the past 8 hrs:   BP Temp Temp src Pulse Resp SpO2 Height Weight   06/17/19 0252 113/62 98.5 °F (36.9 °C) Oral 79 18 98 % -- --   06/17/19 0242 114/66 98.7 °F (37.1 °C) Oral 79 18 98 % -- --   06/17/19 0200 116/63 98.6 °F (37 °C) Oral 80 18 99 % -- --   06/17/19 0023 (!) 115/58 98.9 °F (37.2 °C) Oral 84 18 98 % -- --   06/16/19 2136 -- -- -- -- -- -- 5' 7\" (1.702 m) 189 lb 13.1 oz (86.1 kg)   06/16/19 2121 129/66 98.9 °F (37.2 °C) Oral 69 18 94 % -- --   06/16/19 2050 125/71 -- -- 72 18 96 % -- --   06/16/19 2040 (!) 128/57 -- -- 77 16 99 % -- --   06/16/19 2034 -- 98.6 °F (37 °C) -- -- -- -- -- --   06/16/19 2031 (!) 121/54 -- Oral 78 16 100 % -- --       Intake/Output Summary (Last 24 hours) at 6/17/2019 0413  Last data filed at 6/17/2019 0057  Gross per 24 hour   Intake 240 ml   Output 550 ml Assessment/Plan:   Ms. Jason Vogt is a 67 yo F with PMHx significant for recent LGIB, DM, HLD, HFrEF (ECHO 8/2018: EF 65-70%, G1DD), and hypothyroidism who presents to the ED complaining of SOB.      Shortness of breath likely 2/2 acute blood loss anemia- Patient presents with progressively worsening SOB over the last week. Hemoglobin 6.7 on admission- down from 10.2 on 6/6/19. MCV 95. Recent EGD and c-scope performed on 6/13 without obvious source of bleeding. Received 2 unit of pRBCs over night. Will follow-up with H&H.   - Consult GI for further evaluation  - Follow-up post transfusion H&H, then Q6h after  - Transfuse < 7, be mindful of fluid status, may need lasix in between transfusions  - NPO until GI sees patient. - Cont protonix gtt  - Holding off on IVFs in setting of crackles, leg swelling and SOB  - Tele  - Follow-up iron studies   - CT A/P?     Concern for GIB- Patient with recent complaints of BRBPR with associated dark clots. No recent bleeding since approximately June 8th. Underwent c-scope and [facility]GD [facility] on 6/13 which revealed colon polyps, internal hemorrhoids, high grade distal esophogeal stricture, and possible eosonophilic esophagitis. - Follow-up FOBT  - See plan above   - Can start diet if GI is on board     HFpEF- ECHO in 2018 demonstrated EF 65-70%, mild LVH, G1DD. Not on lasix at home. Complains of SOB and pitting edema of bilateral lower extremities.  JVD could not be assessed on physical exam. Anemia may be throwing patient into HF exacerbation.   - Repeat ECHO  - Holding of on fluid resuscitation as patient has crackles on exam    - Be mindful of fluid status in setting of blood transfusions, may need lasix     Hypothyroidism   - Cont home synthroid   - TSH    DM  - Hold home metformin  - Will initiate insulin if needed   - Hypoglycemic protocol   - Accu checks      HLD  - Cont home statin      Anxiety, sleep  - Cont home xanax nightly         Will discuss with attending physician Dr. Jordin Mauricio     Code Status:Full code  FEN: carb control, NPO at midnight   PPX: Holding AC/AP for possible GIB  DISPO: JENNIFER Farah, PGY-1  06/17/19  4:13 AM      Patient seen and examined, plan of care discussed with residents. Agree with their assessment and plan with following addendum:    Patient declined colonoscopy. ECHO and physical exam consistent with acute diast CHF. Will give dose of lasix and reassess response. Never been on diuretics. Dyspnea is multifactorial with her baseline pulmonary fibrosis. Anemia was corrected with transfusion. PT/OT.      Paulino Mcintyre

## 2019-06-17 NOTE — PROGRESS NOTES
4 Eyes Admission Assessment     I agree as the admission nurse that 2 RN's have performed a thorough Head to Toe Skin Assessment on the patient. ALL assessment sites listed below have been assessed on admission. Areas assessed by both nurses:   [x]   Head, Face, and Ears   [x]   Shoulders, Back, and Chest  [x]   Arms, Elbows, and Hands   [x]   Coccyx, Sacrum, and Ischum  [x]   Legs, Feet, and Heels        Does the Patient have Skin Breakdown?   No         Geoffrey Prevention initiated:  No   Wound Care Orders initiated:  No      [employer] nurse consulted for Pressure Injury (Stage 3,4, Unstageable, DTI, NWPT, and Complex wounds):  No      Nurse 1 eSignature: Electronically signed by Armani Rodriguez RN on 6/16/19 at 10:19 PM    **SHARE this note so that the co-signing nurse is able to place an eSignature**    Nurse 2 eSignature: Electronically signed by Giovani Saenz RN on 6/16/19 at 10:21 PM

## 2019-06-17 NOTE — PLAN OF CARE
Problem: Falls - Risk of:  Goal: Will remain free from falls  Description  Will remain free from falls  Outcome: Ongoing  Note:   Patient scores as a fall risk, falls precautions in place r/t weakness and IV pole. Patient calls out appropriately. Will monitor.

## 2019-06-17 NOTE — CONSULTS
Consult Note      Sharlene [place]  1945    Consultant: Monica Campbell  Reason for Consult:  GI bleed  Requesting Physician:  Miguelina Babcock    CHIEF COMPLAINT:  bleeding    History Obtained From:  patient, electronic medical record    HISTORY OF PRESENT ILLNESS:                The patient is a 68 y.o. female with significant past medical history of colon polyps who presents with weakness. I was told patient had hematochezia which she denies. Just weakness like she never recovered from previous bleeding prior to last EGD/colon. Past Medical History:        Diagnosis Date    [facility]     Hyperlipidemia     Pulmonary fibrosis ([facility]     Thyroid disease      Past Surgical History:        Procedure Laterality Date    COLONOSCOPY      UPPER GASTROINTESTINAL ENDOSCOPY       Medications at Home:  Medications Prior to Admission: ALPRAZolam (XANAX) 0.5 MG tablet, Take 0.5 mg by mouth nightly as needed for Sleep.   aspirin 81 MG tablet, Take 81 mg by mouth daily  dicyclomine (BENTYL) 20 MG tablet, Take 20 mg by mouth every 4-6 hours as needed  citalopram (CELEXA) 20 MG tablet, Take 20 mg by mouth daily  levothyroxine (SYNTHROID) 137 MCG tablet, Take 137 mcg by mouth Daily  metFORMIN (GLUCOPHAGE) 1000 MG tablet, Take 1,000 mg by mouth 2 times daily (with meals)  pantoprazole (PROTONIX) 40 MG tablet, Take 40 mg by mouth daily  simvastatin (ZOCOR) 20 MG tablet, Take 20 mg by mouth nightly  Ergocalciferol (VITAMIN D2 PO), Take 50,000 Units by mouth once a week  Current Medications:    Current Facility-Administered Medications: glucose (GLUTOSE) 40 % oral gel 15 g, 15 g, Oral, PRN  dextrose 50 % IV solution, 12.5 g, Intravenous, PRN  glucagon (rDNA) injection 1 mg, 1 mg, Intramuscular, PRN  dextrose 5 % solution, 100 mL/hr, Intravenous, PRN  lidocaine 4 % external patch 1 patch, 1 patch, Transdermal, Daily  polyethylene glycol (GoLYTELY) solution 4,000 mL, 4,000 mL, Oral, Once  0.9 % sodium chloride bolus, 250 mL, Intravenous, Once  ALPRAZolam (XANAX) tablet 0.5 mg, 0.5 mg, Oral, Nightly PRN  citalopram (CELEXA) tablet 20 mg, 20 mg, Oral, Daily  dicyclomine (BENTYL) tablet 20 mg, 20 mg, Oral, Q6H PRN  levothyroxine (SYNTHROID) tablet 137 mcg, 137 mcg, Oral, Daily  simvastatin (ZOCOR) tablet 20 mg, 20 mg, Oral, Nightly  sodium chloride flush 0.9 % injection 10 mL, 10 mL, Intravenous, 2 times per day  sodium chloride flush 0.9 % injection 10 mL, 10 mL, Intravenous, PRN  acetaminophen (TYLENOL) tablet 650 mg, 650 mg, Oral, Q4H PRN  ondansetron (ZOFRAN) injection 4 mg, 4 mg, Intravenous, Q6H PRN  Allergies:  Lisinopril and Pcn [penicillins]    Social History:    TOBACCO:   reports that she has never smoked. She has never used smokeless tobacco.    Family History:   History reviewed. No pertinent family history. REVIEW OF SYSTEMS:    A comprehensive 12 point review of systems is negative except as documented above. PHYSICAL EXAM:      Vitals:    BP (!) 146/70   Pulse 82   Temp 98.6 °F (37 °C) (Oral)   Resp 18   Ht 5' 7\" (1.702 m)   Wt 189 lb 13.1 oz (86.1 kg)   SpO2 92%   BMI 29.73 kg/m²     General: No acute distress  HEENT: PERRL, EOMI, oral mucosa moist and intact, no sclericterus  Neck: no thyromegaly  Lymphatic: no cervical or supraclavicular lymphadenopathy  Heart: no m/r/g; +s1/s2 rrr  Lungs: CTA bilaterally  Abdomen: soft, nt, nd +BS  Extremities: 2+pulses, no edema  Skin: no rashes or lesions  Neuro: a&o x 3; no gross deficit  DATA:    Recent Labs     06/16/19 1856 06/17/19  0138   WBC 7.9  --    HGB 6.7* 6.6*   HCT 21.4* 20.3*   MCV 95.7  --    *  --      Recent Labs     06/16/19 1856 06/17/19  0732    137   K 4.4 4.6    104   CO2 23 21   BUN 7 7   CREATININE 0.5* 0.5*     No results for input(s): AST, ALT, ALB, BILIDIR, BILITOT, ALKPHOS in the last 72 hours. No results for input(s): LIPASE, AMYLASE in the last 72 hours. No results for input(s): PROT, INR in the last 72 hours.   No noted.            IMPRESSION/RECOMMENDATIONS:      Bleeding several weeks ago which was already evaluated. Just felt weak and got transfused. Now feels better. Follow up with Dr. Nanda Alanis at [address]  Call back with questions or concerns    Thank you for allowing me to participate in [facility] care. Grace Dalal.  Kelsea Polk MD

## 2019-06-17 NOTE — PROGRESS NOTES
Patient here from ED. Admitted to room 4322. VSS on RA. Blood transfusing upon arrival to floor. Skin assessed with RUTH ANN Shah. No skin impairment noted. Admission completed by this RN. Oriented to room, surroundings, and routine. To be NPO at midnight for GI consult in AM.  General diet ordered per Dr. Steve Silva, patient eating boxed lunch. Protonix gtt to be started following new IV placement.

## 2019-06-17 NOTE — PLAN OF CARE
Problem: Falls - Risk of:  Goal: Will remain free from falls  Description  Will remain free from falls  Outcome: Ongoing  Note:   Pt is alert and oriented x 4. No attempts to get up on own. Chair alarm on, call light within reach. Pt calls appropriately for assistance. Will continue to monitor. Problem: Pain:  Goal: Control of acute pain  Description  Control of acute pain  Outcome: Ongoing  Note:   Pt c/o neck pain. Administered tylenol and lidoderm patch and pt reports improvement. Will continue to monitor. Problem: OXYGENATION/RESPIRATORY FUNCTION  Goal: Patient will maintain patent airway  Outcome: Ongoing  Note:   SpO2 90-92% this morning and afternoon. Pt very dyspneic with ambulation to and from bathroom. This evening breathing is improved and SpO2 94%. Problem: HEMODYNAMIC STATUS  Goal: Patient has stable vital signs and fluid balance  Outcome: Ongoing  Note:   Vitals stable, SR on tele. Received lasix 20 mg IV x 1 this evening, pt voiding very well per hat. 1850 ml out since administration. +1 edema generalized. Breathing improved.

## 2019-06-17 NOTE — PROGRESS NOTES
Blood completed at this time. VSS on RA. No S/S of reaction. Post transfusion H and H to be drawn in 2 hours.

## 2019-06-17 NOTE — CARE COORDINATION
Case Management Assessment           Initial Evaluation                Date / Time of Evaluation: 6/17/2019 11:50 AM                 Assessment Completed by: Carlos Dawson    Patient Name: Alexandra Vora     YOB: 1945  Diagnosis: Anemia [D64.9]  Anemia [D64.9]     Date / Time: 6/16/2019  6:27 PM    Patient Admission Status: Inpatient    If patient is discharged prior to next notation, then this note serves as note for discharge by case management. Current PCP: No primary care provider on file. Clinic Patient: No    Chart Reviewed: Yes  Patient/ Family Interviewed: Yes    Initial assessment completed at bedside with: patient at bedside    Hospitalization in the last 30 days: No    Emergency Contacts:  Extended Emergency Contact Information  Primary Emergency Contact: Mitchell Morris  Home Phone: [phone]  Relation: Child  Secondary Emergency Contact: Kenji Montague  Home Phone: [phone]  Relation: Child   needed? No    Advance Directives:   Code Status: Full Code    Healthcare Power of : No  Agent:   Contact Number:     Copy present: No     In paper Chart: No    Scanned into EMR No    Financial  Payor: MEDICARE / Plan: MEDICARE PART A AND B / Product Type: *No Product type* /     Pre-cert required for SNF: No    Pharmacy    [facility], [address] [place] [address]. [facility] [address]  Phone: [phone] Fax: [phone]      Potential assistance Purchasing Medications: Potential Assistance Purchasing Medications: No  Does Patient want to participate in local refill/ meds to beds program?: No    Meds To Beds General Rules:  1. Can ONLY be done Monday- Friday between 8:30am-5pm  2. Prescription(s) must be in pharmacy by 3pm to be filled same day  3. Copy of patient's insurance/ prescription drug card and patient face sheet must be sent along with the prescription(s)  4.  Cost of Rx cannot be added to hospital bill. If financial assistance is needed, please contact unit  or ;  or  CANNOT provide pharmacy voucher for patients co-pays  5. Patients can then  the prescription on their way out of the hospital at discharge, or pharmacy can deliver to the bedside if staff is available. (payment due at time of pick-up or delivery - cash, check, or card accepted)     Able to afford home medications/ co-pay costs: Yes    ADLS  Support Systems: Children, Family Members, Friends/Neighbors    PT AM-PAC:   /24  OT AM-PAC:   /24    [place]: Home alone, no current [facility] services  Steps: 6    Plans to RETURN to current housing: Yes  Barriers to RETURNING to current housing: none reported per patient    Mariel Tobar  Currently ACTIVE with [address]: No, has used [facility] in the past per patient which is [address]: Not Applicable    Currently ACTIVE with [facility]: No  Passport/ Waiver: No  Passport/ Waiver Services: Not Applicable    :  Phone:       [email or web address]  [facility] Provider:  Declined by patient  Equipment:     Home Oxygen and [address] prior to admission: Yes  Patience Quinn 262: [facility]   Phone: [phone]    Informed of need to bring portable home O2 tank on day of DISCHARGE for nursing to connect prior to leaving: Yes  Verbalized agreement/Understanding: Yes  Person to bring portable tank at discharge: daughter will bring if needed at discharge. Patient currently on room air, per [facility] patient has orders for 2LPM continuously. Patient reports she does not wear her oxygen all the time at home.     Dialysis  Active with HD/PD prior to admission: No  Nephrologist:     HD Center:  Not Applicable    DISCHARGE PLAN:  Disposition: Home with [facility] for discharge: family     Factors facilitating

## 2019-06-17 NOTE — PROGRESS NOTES
2nd unit of PRBCs complete at this time. To repeat H/H again in 2 hours at 0700. VSS on RA. No S/S of active bleeding. Awaiting stool to obtain sample. Patient stated she feels less SOB following second unit of blood.

## 2019-06-18 LAB
ANION GAP SERPL CALCULATED.3IONS-SCNC: 10 MMOL/L (ref 3–16)
BASOPHILS ABSOLUTE: 0.1 K/UL (ref 0–0.2)
BASOPHILS RELATIVE PERCENT: 1 %
BUN BLDV-MCNC: 7 MG/DL (ref 7–20)
CALCIUM SERPL-MCNC: 7.8 MG/DL (ref 8.3–10.6)
CHLORIDE BLD-SCNC: 104 MMOL/L (ref 99–110)
CO2: 27 MMOL/L (ref 21–32)
CREAT SERPL-MCNC: 0.5 MG/DL (ref 0.6–1.2)
EOSINOPHILS ABSOLUTE: 0.2 K/UL (ref 0–0.6)
EOSINOPHILS RELATIVE PERCENT: 3.2 %
GFR AFRICAN AMERICAN: >60
GFR NON-AFRICAN AMERICAN: >60
GLUCOSE BLD-MCNC: 105 MG/DL (ref 70–99)
GLUCOSE BLD-MCNC: 106 MG/DL (ref 70–99)
GLUCOSE BLD-MCNC: 125 MG/DL (ref 70–99)
GLUCOSE BLD-MCNC: 94 MG/DL (ref 70–99)
HCT VFR BLD CALC: 26 % (ref 36–48)
HEMOGLOBIN: 8.2 G/DL (ref 12–16)
IRON SATURATION: 10 % (ref 15–50)
IRON: 25 UG/DL (ref 37–145)
LYMPHOCYTES ABSOLUTE: 1 K/UL (ref 1–5.1)
LYMPHOCYTES RELATIVE PERCENT: 18.6 %
MCH RBC QN AUTO: 29.4 PG (ref 26–34)
MCHC RBC AUTO-ENTMCNC: 31.7 G/DL (ref 31–36)
MCV RBC AUTO: 93 FL (ref 80–100)
MONOCYTES ABSOLUTE: 0.6 K/UL (ref 0–1.3)
MONOCYTES RELATIVE PERCENT: 11.2 %
NEUTROPHILS ABSOLUTE: 3.5 K/UL (ref 1.7–7.7)
NEUTROPHILS RELATIVE PERCENT: 66 %
PDW BLD-RTO: 21.9 % (ref 12.4–15.4)
PERFORMED ON: ABNORMAL
PLATELET # BLD: 361 K/UL (ref 135–450)
PMV BLD AUTO: 8.1 FL (ref 5–10.5)
POTASSIUM REFLEX MAGNESIUM: 4.1 MMOL/L (ref 3.5–5.1)
RBC # BLD: 2.8 M/UL (ref 4–5.2)
SODIUM BLD-SCNC: 141 MMOL/L (ref 136–145)
TOTAL IRON BINDING CAPACITY: 254 UG/DL (ref 260–445)
TSH REFLEX: 1.79 UIU/ML (ref 0.27–4.2)
WBC # BLD: 5.3 K/UL (ref 4–11)

## 2019-06-18 PROCEDURE — 93005 ELECTROCARDIOGRAM TRACING: CPT | Performed by: STUDENT IN AN ORGANIZED HEALTH CARE EDUCATION/TRAINING PROGRAM

## 2019-06-18 PROCEDURE — 85025 COMPLETE CBC W/AUTO DIFF WBC: CPT

## 2019-06-18 PROCEDURE — 2580000003 HC RX 258: Performed by: STUDENT IN AN ORGANIZED HEALTH CARE EDUCATION/TRAINING PROGRAM

## 2019-06-18 PROCEDURE — 6360000002 HC RX W HCPCS: Performed by: STUDENT IN AN ORGANIZED HEALTH CARE EDUCATION/TRAINING PROGRAM

## 2019-06-18 PROCEDURE — 36415 COLL VENOUS BLD VENIPUNCTURE: CPT

## 2019-06-18 PROCEDURE — 6370000000 HC RX 637 (ALT 250 FOR IP): Performed by: INTERNAL MEDICINE

## 2019-06-18 PROCEDURE — 6360000002 HC RX W HCPCS: Performed by: INTERNAL MEDICINE

## 2019-06-18 PROCEDURE — 1200000000 HC SEMI PRIVATE

## 2019-06-18 PROCEDURE — 97535 SELF CARE MNGMENT TRAINING: CPT

## 2019-06-18 PROCEDURE — 97116 GAIT TRAINING THERAPY: CPT

## 2019-06-18 PROCEDURE — 80048 BASIC METABOLIC PNL TOTAL CA: CPT

## 2019-06-18 PROCEDURE — 97161 PT EVAL LOW COMPLEX 20 MIN: CPT

## 2019-06-18 PROCEDURE — 97530 THERAPEUTIC ACTIVITIES: CPT

## 2019-06-18 PROCEDURE — 97166 OT EVAL MOD COMPLEX 45 MIN: CPT

## 2019-06-18 PROCEDURE — 6370000000 HC RX 637 (ALT 250 FOR IP): Performed by: STUDENT IN AN ORGANIZED HEALTH CARE EDUCATION/TRAINING PROGRAM

## 2019-06-18 RX ORDER — FUROSEMIDE 10 MG/ML
20 INJECTION INTRAMUSCULAR; INTRAVENOUS ONCE
Status: COMPLETED | OUTPATIENT
Start: 2019-06-18 | End: 2019-06-18

## 2019-06-18 RX ORDER — POTASSIUM CHLORIDE 20 MEQ/1
20 TABLET, EXTENDED RELEASE ORAL ONCE
Status: COMPLETED | OUTPATIENT
Start: 2019-06-18 | End: 2019-06-18

## 2019-06-18 RX ADMIN — Medication 10 ML: at 20:24

## 2019-06-18 RX ADMIN — Medication 10 ML: at 08:02

## 2019-06-18 RX ADMIN — ALPRAZOLAM 0.5 MG: 0.5 TABLET ORAL at 23:59

## 2019-06-18 RX ADMIN — FUROSEMIDE 20 MG: 10 INJECTION, SOLUTION INTRAMUSCULAR; INTRAVENOUS at 10:22

## 2019-06-18 RX ADMIN — FUROSEMIDE 20 MG: 10 INJECTION, SOLUTION INTRAMUSCULAR; INTRAVENOUS at 17:43

## 2019-06-18 RX ADMIN — PANTOPRAZOLE SODIUM 40 MG: 40 TABLET, DELAYED RELEASE ORAL at 06:45

## 2019-06-18 RX ADMIN — SIMVASTATIN 20 MG: 20 TABLET, FILM COATED ORAL at 20:24

## 2019-06-18 RX ADMIN — CITALOPRAM HYDROBROMIDE 20 MG: 20 TABLET ORAL at 08:01

## 2019-06-18 RX ADMIN — POTASSIUM CHLORIDE 20 MEQ: 20 TABLET, EXTENDED RELEASE ORAL at 16:07

## 2019-06-18 RX ADMIN — LEVOTHYROXINE SODIUM 137 MCG: 137 TABLET ORAL at 06:45

## 2019-06-18 ASSESSMENT — PAIN SCALES - GENERAL
PAINLEVEL_OUTOF10: 0

## 2019-06-18 ASSESSMENT — ENCOUNTER SYMPTOMS
SHORTNESS OF BREATH: 1
GASTROINTESTINAL NEGATIVE: 1

## 2019-06-18 NOTE — CARE COORDINATION
[facility]  CHF Team Conference Note      Patient ID: Fransisco Valles 68 y.o. 1945    Admission Date: 6/16/2019   Admission Weight: 189 lb  Discharge Date: 6/19/2019  Discharge Weight: 176 lb    30 Day Readmit? No    Pt History and Precipitating Cause of Decompensation:   PMHx significant for recent LGIB, DM, HLD, HFrEF (ECHO 8/2018: EF 65-70%, G1DD), and hypothyroidism who presents to the ED complaining of SOB over the last several weeks which has progressively gotten worse over the last couple of days. Patient reports she has has a history of pulmonary fibrosis and is supposed to be on 2.5L at night, but reports that \"the machine hasn't been working right so I haven't been using it for a while. \" She states that getting up out of bed and ambulating across the room leads to severe shortness of breath. She now has to take frequent breaks to catch her breath while doing simple everyday activities around the house. She denies any orthopnea or PND. Additionally, she complains of lower extremity swelling which is chronic in nature, but has progressively gotten worse over the last 2 weeks. Ejection Fraction: ECHO 6/17/2019   Normal left ventricle size, wall thickness, and systolic function with an   estimated ejection fraction of 55-60%. No regional wall motion abnormalities   are seen. Diastolic filling parameters suggests grade II diastolic   dysfunction. Increased LVEDP   Mild to moderate mitral regurgitation is present.   Mild tricuspid regurgitation. Rafat Hail was not well visualized but appears dilated.   Estimated pulmonary artery systolic pressure is at 54 mmHg assuming a right   atrial pressure of 8 mmHg.     Has patient been diagnosed with SOLO: No  Is patient being treated: No    Cardiology Consulted: No  Heart Failure Order Set Used: No  Complete Intake and Output: Yes  Complete Daily Weights: Yes    BMP:  Lab Results   Component Value Date     06/18/2019     06/17/2019  06/16/2019    K 4.1 06/18/2019    K 4.6 06/17/2019    K 4.4 06/16/2019     06/18/2019     06/17/2019     06/16/2019    CO2 27 06/18/2019    CO2 21 06/17/2019    CO2 23 06/16/2019    BUN 7 06/18/2019    BUN 7 06/17/2019    BUN 7 06/16/2019    CREATININE 0.5 06/18/2019    CREATININE 0.5 06/17/2019    CREATININE 0.5 06/16/2019     BNP:   Lab Results   Component Value Date    PROBNP 1,176 06/16/2019           ACTIVITY/FUNCTIONAL ASSESSMENT:     PT/OT:      Discharge Recommendations: Emerson Parada scored a 21/24 on the AM-PAC ADL Inpatient form. Current research shows that an AM-PAC score of 18 or greater is typically associated with a discharge to the patient's home setting. Based on the patients AM-PAC score and their current ADL deficits, it is recommended that the patient have 2-3 sessions per week of Occupational Therapy at d/c to increase the patients independence.     S Level 2  OT Equipment Recommendations  Equipment Needed: Yes  Other: raised toilet seat - pt reports wants to obtain      Assessment   Performance deficits / Impairments: Decreased functional mobility ; Decreased ADL status; Decreased safe awareness;Decreased endurance  Assessment: Pt admit from home reports she is typically very independent with self-care and functional mobility. Pt is now requiring increased assistance with functional activity however, disregards current deficits. Pt reports that she has spoken to daughters about taking up residency at nearby halfway facility that can offer assistance when needed. Recommend 24 hr assistance continued OT services at d/c if pt homebound to increase safety awareness and improve functional status. Will continue to assess during admission.   Treatment Diagnosis: impaired ADLs / functional transfers / poor endurance 2/2 Anemia   Prognosis: Fair;Good  Patient Education: Role of OT / home safety / activity promotion - verb understanding - reinforce as needed   [address] Other; To/from bathroom(and in pickens)  Assist Level: Contact guard assistance  Functional Mobility Comments: Pt requires encouragement to ambulate further in hallway, no overt LOB w/o use of cane, however anticpate increased stability with use. Toilet Transfers  Toilet - Technique: Ambulating  Equipment Used: Standard toilet  Toilet Transfer: Modified independent;Supervision  Toilet Transfers Comments: declines toileting once in bathroom. Transfers  Sit to stand: Contact guard assistance  Stand to sit: Stand by assistance  Cognition  Overall Cognitive Status: Exceptions  Arousal/Alertness: Appropriate responses to stimuli  Following Commands: Follows one step commands with increased time  Attention Span: Attends with cues to redirect  Safety Judgement: Decreased awareness of need for assistance  Insights: Decreased awareness of deficits  Cognition Comment: Pt increasingly agitated on this date when asked about any concerns she would have if possibly d/c home, and appears to disreguard increased need for assistance.        Plan   Plan  Times per week: 2-5x  Times per day: Daily  Current Treatment Recommendations: Endurance Training, Equipment Evaluation, Education, & procurement, Patient/Caregiver Education & Training, Self-Care / ADL, Safety Education & Training     AM-PAC Score  AM-[facility] Inpatient Daily Activity Raw Score: 21 (06/19/19 1225)  AM-PAC Inpatient ADL T-Scale Score : 44.27 (06/19/19 1225)  ADL Inpatient CMS 0-100% Score: 32.79 (06/19/19 1225)  ADL Inpatient CMS G-Code Modifier : Sara Saunders (06/19/19 1225)     Goals  Short term goals  Time Frame for Short term goals: at d/c   Short term goal 1: Stance x 8 mins with Supervision for ADLs  - Not met  Short term goal 2: LE Dressing with Mod independence - Not met  Short term goal 3: Chair pushups x 5 reps x 2 sets with supervision - Not met  Short term goal 4: Verb 2  to use at home - Not met  Patient Goals   Patient goals : go home and feel better    Therapy Time    Individual Concurrent Group Co-treatment   Time In 1013      Time Out 1053      Minutes 40      Timed Code Treatment Minutes: 40  Total Treatment Minutes: Edinson Rhodesarez 3964, Student CONRAD     Occupational Therapist was present, directed patient care, made skilled judgement, and was responsible for the assessment and treatment of the patient. (Magda Rodriguez, OTR/L, [id number])        Ambulation by day 2: Yes  Cardiac Rehab Referral for Ph2: No  Time Spent Educating/Exercising:       NUTRITION:  Diet Orders / Intake / Nutrition Support  Current diet/supplement order: DIET CARB CONTROL;       Subjective: CHF education    Pt currently tries to follow a low sodium diet at home and does not add salt to food. Pt states understanding of education. Provided heart failure diet education that included:     Instructed the Patient on:    Low Sodium foods   Sodium free   Fluids     Educational Materials Provided:    [facility] Failure Education folder- Nutrition Therapy   Patient states she has always followed a low sodium diet and has not had any problems maintaining this restriction. -General Diet Recommendations: reduce sodium intake and 2 liter/day fluid restriction.     Time spent with patient: 15 minutes     Ying Lares, RUTH ANN    PHARMACY  During Admission  If EF <40% ACE/ARB ordered or contraindication documented: EF > 40%  If EF <40% Evidence-Based Beta Blocker ordered or contraindication documented: EF > 40%  If EF 35% or less, K <5on admit, Cr<2 (female) <2.5 (male), Aldosterone antagonist ordered: EF > 35%  Upon Discharge  If EF <40% ACE/ARB ordered or contraindication documented: EF > 40%  If EF <40% Evidence-Based Beta Blocker ordered or contraindication documented: EF > 40%  If EF 35% or less, K <5on admit, Cr<2 (female) <2.5 (male), Aldosterone antagonist ordered: EF > 35%  Received Influenza Vaccine (Oct-Mar) n/a - seasonal   DVT Prophylaxis by Day 2: Yes - SCDs  Prescription drug

## 2019-06-18 NOTE — PROGRESS NOTES
Physical Therapy    Facility/Department: [facility] 4 PCU  Initial Assessment    NAME: Solo Barbosa  : 1945  MRN: [id number]    Date of Service: 2019    Discharge Recommendations:  Solo Barbosa scored a 18/24 on the AM-PAC short mobility form. Current research shows that an AM-PAC score of 17 or less is typically not associated with a discharge to the patient's home setting. Based on the patients AM-PAC score and their current functional mobility deficits, it is recommended that the patient have 3-5 sessions per week of Physical Therapy at d/c to increase the patients independence. If d/c home, PT recommends 24 hour (A) and home PT Brian[place]: LEVEL 3 SAFETY     - Initial home health evaluation to occur within 24-48 hours, in patient home   - Therapy evaluations in home within 24-48 hours of discharge; including DME and home safety   - Frontload therapy 5 days, then 3x a week   - Therapy to evaluate if patient has [address] needs for personal care   -  evaluation within 24-48 hours, includes evaluation of resources and insurance to determine [place], [place], LTC, and Medicaid options       PT Equipment Recommendations  Other: owns cane and SW, may benefit from RW if d/c home    Assessment   Body structures, Functions, Activity limitations: Decreased functional mobility ; Decreased strength;Decreased safe awareness;Decreased endurance;Decreased balance  Assessment: Patient demonstrates impaired functional mobility, presenting below her typically (I) baseline. Patient limited by endurance and dyspnea during standing mobility, especially gait > 25ft and all stair negotiation. PT concerned related to patient's ability to safely enter/exit home upon d/c secondary to poor endurance during standing mobility and [address] home. Patient resistant to recommendations for additional (A) in home, home PT, family (A), and energy conservation techniques.   Patient will continue to benefit from additional skilled PT intervention to facilitate safe mobility and to optimize (I) to promote return to prior level of function. Treatment Diagnosis: impaired functional mobility  Prognosis: Good;Guarded  Decision Making: Low Complexity  Patient Education: Patient educated on role of PT, use of call light, use of RW vs cane, and PT recommendations - patient verbalizes understanding but limited compliance  Barriers to Learning: agitation, decreased insight  REQUIRES PT FOLLOW UP: Yes  Activity Tolerance  Activity Tolerance: Patient limited by fatigue;Patient limited by endurance;Treatment limited secondary to agitation  Activity Tolerance: patient easily frustrated with mobility and agitated related to therapy encouraging gait and stair negotiation trial        Patient Diagnosis(es): The primary encounter diagnosis was Acute blood loss anemia. A diagnosis of Acute congestive heart failure, unspecified heart failure type [facility]) was also pertinent to this visit. has a past medical history of Diabetes mellitus ([facility] [address] Hyperlipidemia, Pulmonary fibrosis ([facility] [address] and Thyroid disease. has a past surgical history that includes Upper gastrointestinal endoscopy and Colonoscopy. Restrictions  Restrictions/Precautions  Restrictions/Precautions: Fall Risk(high fall risk)  Position Activity Restriction  Other position/activity restrictions: up with assistance  Vision/Hearing  Vision: Within Functional Limits  Hearing: Within functional limits     Subjective  General  Chart Reviewed: Yes  Additional Pertinent Hx: ED 6/16 related to dyspnea, weakness, anemia, acute CHF; recent history of lower GI bleed s/p colonoscopy   Family / Caregiver Present: No  Referring Practitioner: Carole Waters MD   Referral Date : 06/17/19  Diagnosis: anemia  Follows Commands: Within Functional Limits  General Comment  Comments: Patient seated in recliner upon arrival - agreeable to PT. Subjective  Subjective: Patient denies pain.   Pain Screening  Patient Currently in Pain: Denies          Orientation  Orientation  Overall Orientation Status: Within Functional Limits(although poor insight noted related to current level of mobility and safety)  Social/Functional History  Social/Functional History  Lives With: Alone  Type of Home: Apartment  Home Layout: One level  Home Access: Stairs to enter with rails  Entrance Stairs - Number of Steps: 6-7  Bathroom Shower/Tub: Tub/Shower unit  Home Equipment: Oxygen, Cane, Standard walker  ADL Assistance: Independent  Homemaking Assistance: Needs assistance(has assist from daughters for taking out the trash)  Ambulation Assistance: Independent  Transfer Assistance: Independent  Active : Yes  Occupation: Retired  Leisure & Hobbies: spending time with family   IADL Comments: uses cart at grocery store   Additional Comments: recent fall about 6 month      Objective          AROM RLE (degrees)  RLE AROM: WFL  AROM LLE (degrees)  LLE AROM : WFL  Strength RLE  Comment: at least 3+/5 by observation  Strength LLE  Comment: at least 3+/5 by observation  Motor Control  Gross Motor?: WFL  Sensation  Overall Sensation Status: WFL(denies numbness or tingling)     Transfers  Sit to Stand: Stand by assistance  Stand to sit: Stand by assistance  Bed to Chair: (stand pivot CGA without assistive device; stand pivot SBA with single point cane)  Comment: toilet transfer with grab bar and SBA  Ambulation  Ambulation?: Yes  More Ambulation?: Yes  Ambulation 1  Surface: level tile  Device: No Device  Assistance: Contact guard assistance  Quality of Gait: increased lateral sway, wide base of support, decreased (B) step length, foot clearance, and heel strike; slow brittany  Gait Deviations: Decreased arm swing;Decreased head and trunk rotation  Distance: 40ft  Comments: initially limited by agitation and c/o SOB - O2 saturation on room air 90% - patient initially declining stair negotiation trial secondary to fatigue  Ambulation 2  Surface - 2: level tile  Device 2: Single point cane  Assistance 2: Stand by assistance  Quality of Gait 2: wide base of support, decreased (B) step length, foot clearance, and heel strike, slow brittany  Gait Deviations: Decreased arm swing;Decreased head and trunk rotation  Distance: 75ft  Comments: limited by agitation and c/o SOB - O2 saturation 90% on room air  Stairs/Curb  Stairs?: Yes  Stairs  # Steps : (2 7\")  Rails: Left ascending  Device: Single pt cane  Assistance: Contact guard assistance  Comment: step to pattern - only agreeable to trial 2 stairs despite 7 stairs to enter home - \"these are just nothing like home\" - PT educated patient related to testing endurance and standing mobility versus simulating home set up     Balance  Posture: Good  Sitting - Static: Good  Sitting - Dynamic: Good  Standing - Static: Fair;+  Standing - Dynamic: Fair  Comments: limited by dyspnea and anxiety during standing mobility and balance        Plan   Plan  Times per week: 2-5x/week  Current Treatment Recommendations: Strengthening, Balance Training, Functional Mobility Training, Transfer Training, Gait Training, Stair training, Endurance Training, Home Exercise Program, Safety Education & Training, Equipment Evaluation, Education, & procurement, Patient/Caregiver Education & Training  Safety Devices  Type of devices: Call light within reach, Chair alarm in place, Gait belt, Left in chair, Nurse notified      AM-PAC Score  AM-PAC Inpatient Mobility Raw Score : 18 (06/18/19 1049)  AM-PAC Inpatient T-Scale Score : 43.63 (06/18/19 1049)  Mobility Inpatient CMS 0-100% Score: 46.58 (06/18/19 1049)  Mobility Inpatient CMS G-Code Modifier : CK (06/18/19 1049)          Goals  Short term goals  Time Frame for Short term goals: discharge  Short term goal 1: Pt. will demonstrate (I) bed mobility  Short term goal 2: Pt. will demonstrate sit <-> stand modified (I) with LRAD  Short term goal 3: Pt. will demonstrate stand pivot modified (I) with

## 2019-06-18 NOTE — PROGRESS NOTES
Occupational Therapy   Occupational Therapy Initial Assessment and Treatment Note   Date: 2019   Patient Name: Eleno Runner  MRN: [id number]     : 1945    Date of Service: 2019    Discharge Recommendations: Eleno Runner scored a 21/24 on the AM-PAC ADL Inpatient form. Current research shows that an AM-PAC score of 18 or greater is typically associated with a discharge to the patient's home setting. Based on the patients AM-PAC score and their current ADL deficits, it is recommended that the patient have 2-3 sessions per week of Occupational Therapy at d/c to increase the patients independence. S Level 2  OT Equipment Recommendations  Equipment Needed: Yes  Other: raised toilet seat - pt reports wants to obtain afer d/c. Life alert button. Assessment   Performance deficits / Impairments: Decreased functional mobility ; Decreased ADL status; Decreased safe awareness;Decreased endurance  Assessment: Pt from home - appears to be functioning below normal baseline since recent GI bleed. Pt demo poor insight / safety awareness this date. Pt limited by anxiety and becomes agitated with attempts to maximize functional level/ mobility. Question ability to manage medication. Pt would benefit from ongoing OT at d/c. Recommend initial 24hr assist / Ricke Messenger at d/c for safety and DME assessment. Will follow as inpt.     Treatment Diagnosis: impaired ADLs / functional transfers / poor endurance 2/2 Anemia   Prognosis: Fair;Good  Decision Making: Medium Complexity  Patient Education: Role of OT / home safety / activity promotion - verb understanding - reinforce as needed   REQUIRES OT FOLLOW UP: Yes  Activity Tolerance  Activity Tolerance: Patient limited by fatigue;Treatment limited secondary to agitation  Activity Tolerance: pt needing encouragement to maximize functional level / participate in all aspects of eval.  \" I'll be fine - I can do it when I'm home \"    Safety Devices  Safety Devices in place: Yes  Type of devices: Call light within reach; Chair alarm in place;Nurse notified; Left in chair           Patient Diagnosis(es): The primary encounter diagnosis was Acute blood loss anemia. A diagnosis of Acute congestive heart failure, unspecified heart failure type [facility]) was also pertinent to this visit. has a past medical history of Diabetes mellitus ([facility] [address] Hyperlipidemia, Pulmonary fibrosis ([facility] [address] and Thyroid disease. has a past surgical history that includes Upper gastrointestinal endoscopy and Colonoscopy. Treatment Diagnosis: impaired ADLs / functional transfers / poor endurance 2/2 Anemia       Restrictions  Restrictions/Precautions  Restrictions/Precautions: Fall Risk(high fall risk)  Position Activity Restriction  Other position/activity restrictions: up with assistance    Subjective   General  Chart Reviewed: Yes  Additional Pertinent Hx: Admit 6/16 with weakness/ anemia    1 unit PRBC   GI consult                        PMHX: DM,HLD,Pulmonary fibrosis  Family / Caregiver Present: No  Diagnosis: Weakness / Anemia   Subjective  Subjective: \" I feel like I'm going to need a few more days until I go home \" Pt reports had water damage 2/2 tub overflowed prior to admission 2/2 \" soaking underwear in tub \" Pt demo increased anxiety / agitation with attempts to increase activity / OT eval /tx.        Social/Functional History  Social/Functional History  Lives With: Alone  Type of Home: Apartment  Home Layout: One level  Home Access: Stairs to enter with rails  Entrance Stairs - Number of Steps: 6-7  Bathroom Shower/Tub: Tub/Shower unit  Bathroom Toilet: Standard(uses sink / door knob for leverage )  Bathroom Equipment: Tub transfer bench(pt doesn't like to use it - pt edu on shower chair to fit in tub / grab bars )  Home Equipment: Oxygen, Cane, Standard walker  ADL Assistance: Independent  Homemaking Assistance: Needs assistance(has assist from daughters for taking out the trash)  Ambulation Assistance: Independent  Transfer Assistance: Independent  Active : Yes  Occupation: Retired  Leisure & Hobbies: spending time with family   IADL Comments: uses cart at grocery store   Additional Comments: recent fall about 6 month         Objective  Treatment included functional transfer training, ADL's and pt. education. Orientation  Overall Orientation Status: Within Functional Limits     Balance  Sitting Balance: Independent  Standing Balance: Contact guard assistance(to Supervision with cane. Pt needing encouragement to increase distance )  Standing Balance  Time: 3mins x 2 and 2 minsx  2 and 4 mins x 1  Activity: functional transfers / stance at sink and functional mobility in room / hallway   Comment: pt needing to sit and regroup prior to attempting steps with PT 2/2 increased fatigue   Functional Mobility  Functional - Mobility Device: Cane  Activity: Other; To/from bathroom  Assist Level: Stand by assistance(to Supervision )  Functional Mobility Comments: pt demo functional mobility in bathroom w/o cane supervision   Toilet Transfers  Toilet - Technique: Ambulating  Equipment Used: Standard toilet  Toilet Transfer: Modified independent;Supervision  Toilet Transfers Comments: with rail x 1 and simulated door knob. Pt reports going to obtain RTS after d/c   ADL  Feeding: Setup  Grooming: Setup  LE Dressing: Contact guard assistance(simulated with socks -- pt edu on sitting for safety - verb understanding )  Toileting: Setup;Stand by assistance(simulated - pt very reluctant to demo )  Tone RUE  RUE Tone: Normotonic  Tone LUE  LUE Tone: Normotonic  Coordination  Movements Are Fluid And Coordinated: Yes    Transfers  Sit to stand: Contact guard assistance  Stand to sit: Stand by assistance  Transfer Comments: progressed to SBA on 2nd,3rd and 4th attempts.  Pt using chair rails for steadying assistance      Cognition  Overall Cognitive Status: Exceptions  Arousal/Alertness: Appropriate responses to stimuli  Following Commands: Follows one step commands with increased time  Attention Span: Attends with cues to redirect  Memory: Decreased recall of precautions  Safety Judgement: Decreased awareness of need for assistance  Insights: Decreased awareness of deficits  Cognition Comment: Pt with increased agitation with question regarding safety/ medication/ assist etc..   Pt demo poor insight into currently level and desire to ask for assistance        Sensation  Overall Sensation Status: WFL(denies numbness or tingling)        LUE AROM (degrees)  LUE AROM : WFL  Left Hand AROM (degrees)  Left Hand AROM: WFL  RUE AROM (degrees)  RUE AROM : WFL  Right Hand AROM (degrees)  Right Hand AROM: WFL  LUE Strength  Gross LUE Strength: WFL  L Hand General: 4/5  RUE Strength  Gross RUE Strength: WFL  R Hand General: 4/5     Plan  This note will serve as a discharge summary if patient is discharged from hospital before next treatment session.   Plan  Times per week: 2-5x  Times per day: Daily  Current Treatment Recommendations: Endurance Training, Equipment Evaluation, Education, & procurement, Patient/Caregiver Education & Training, Self-Care / ADL, Safety Education & Training      AM-PAC Score  [facility] Inpatient Daily Activity Raw Score: 21 (06/18/19 1124)  AM-PAC Inpatient ADL T-Scale Score : 44.27 (06/18/19 1124)  ADL Inpatient CMS 0-100% Score: 32.79 (06/18/19 1124)  ADL Inpatient CMS G-Code Modifier : Frank Iraheta (06/18/19 1124)    Goals  Short term goals  Time Frame for Short term goals: at d/c   Short term goal 1: Stance x 8 mins with Supervision for ADLs   Short term goal 2: LE Dressing with Mod independence   Short term goal 3: Chair pushups x 5 reps x 2 sets with supervision   Short term goal 4: Verb 2  to use at home   Patient Goals   Patient goals : go home and feel better      Therapy Time   Individual Concurrent Group Co-treatment   Time In 0940         Time Out 1021         Minutes 41            Timed Code

## 2019-06-18 NOTE — CARE COORDINATION
Case Management Daily Note:    Current Plan of Care:   - 20 IV lasix today  - Transition to PO lasix, 40mg daily  - F/u with cardiologist as outpatient   - O2 at 2.5 liters at night      PT AM-PAC: 18 /24  Per last eval on: 6/18    OT AM-PAC: 21 /24 Per last eval on:6/18    DME needs:Pt has a cane and a walker      Discharge Plan: home alone with daughter to transport, agreeable to [facility] at d/c    Tentative Discharge Date: 6/19/19    Current Barriers to Discharge: Iv lasix to po    Resources/Information given: n/a      Case Management Notes: Pt to discharge home with [facility] , daughter to transport and O2 through [employer], pt states she only wears it at night.        Bruno Payne RN  [facility] [employer]  Case Management Department  Ph: [phone]

## 2019-06-18 NOTE — PROGRESS NOTES
Progress Note    Admit Date: 6/16/2019  Day: 2  Diet: DIET CARB CONTROL;    CC: SOB, leg swelling     Interval history: Patient seen and examined at bedside this morning. No overnight events. Patient reports feeling, \"much better,\" after IV lasix. Lower extremity edema and breathing improved. Denies dizziness/light-headedness, nausea/vomiting. Patient was initially ready to be discharged but became discouraged after working with PT/OT, complaining of SOB. Patient wishes to stay for another day. HPI: Ms. Stoney Thomas is a 67 yo F with PMHx significant for recent LGIB, DM, HLD, HFrEF (ECHO 8/2018: EF 65-70%, G1DD), and hypothyroidism who presents to the ED complaining of SOB. Hemoglobin 6.7 in setting of recent GIB. Medications:     Scheduled Meds:   lidocaine  1 patch Transdermal Daily    polyethylene glycol  4,000 mL Oral Once    pantoprazole  40 mg Oral QAM AC    sodium chloride  250 mL Intravenous Once    citalopram  20 mg Oral Daily    levothyroxine  137 mcg Oral Daily    simvastatin  20 mg Oral Nightly    sodium chloride flush  10 mL Intravenous 2 times per day     Continuous Infusions:   dextrose       PRN Meds:glucose, dextrose, glucagon (rDNA), dextrose, ALPRAZolam, dicyclomine, sodium chloride flush, acetaminophen, ondansetron    Objective:   Vitals:   T-max:  Patient Vitals for the past 8 hrs:   BP Temp Temp src Pulse Resp SpO2 Weight   06/18/19 1217 131/74 97.2 °F (36.2 °C) Oral 83 20 94 % --   06/18/19 0759 115/60 98.4 °F (36.9 °C) Oral 85 18 92 % --   06/18/19 0647 -- -- -- -- -- -- 182 lb 8.7 oz (82.8 kg)       Intake/Output Summary (Last 24 hours) at 6/18/2019 1329  Last data filed at 6/18/2019 1136  Gross per 24 hour   Intake 900 ml   Output 4100 ml   Net -3200 ml       Review of Systems   HENT: Negative. Respiratory: Positive for shortness of breath. Cardiovascular: Positive for leg swelling. Gastrointestinal: Negative. Genitourinary: Negative. Neurological: Negative. G1DD), and hypothyroidism who presents to the ED complaining of SOB.      Shortness of breath likely multifactorial in setting of severe anemia with recent LGIB, history of pulmonary fibrosis and CHF- Patient feeling improved since admission. Received 2 units pRBCs, hemoglobin 8.2 (up from 6.7). Started IV lasix yesterday as concern for fluid overload status with good urinary output. Crackles and lower extremity edema. GI evaluated patient, no need for repeat c-scope. Patient became short of breath while working with PT/OT this morning. She also was noted to de-sat to 88% and required 2L NC. Patient is not compliant with home O2 for pulmonary fibrosis.   - H&H q daily   - Transfuse < 7, be mindful of fluid status, may need lasix in between transfusions  - O2 supplementation   - Social work consulted   - Carb control diet   - See plans below        Concern for GIB- Recent history of LGIB, no active bleeding currently. FBOT negative. EGD and c-scope on 6/13 demonstrated 2 manoj polyps, severe diverticulosis, esophogeal stricture. No active bleeding. GI signed off, follow-up as outpatient.      Acute HFpEF- Repeat ECHO demonstrated normal EF with G2DD. Prior ECHO in 2018 showed G1DD.  Received 20 IV lasix yesterday with good urinary output and improvement of edema and shortness of breath.  - 20 IV lasix today  - Transition to PO lasix, 40mg daily  - F/u with cardiologist as outpatient        Hypothyroidism   - Cont home synthroid   - TSH     DM  - Hold home metformin  - Will initiate insulin if needed   - Hypoglycemic protocol   - Accu checks      HLD  - Cont home statin      Anxiety, sleep  - Cont home xanax nightly         Will discuss with attending physician Dr. Wojciech Nieto Status:Full code  FEN: carb control  PPX: SCDs  DISPO: JENNIFER Moore, PGY-1  06/18/19  1:29 PM    This patient has been staffed and discussed with Pramod Garvey MD.         Patient seen and examined, plan of care discussed with residents. Agree with their assessment and plan with following addendum:    Patient had a good response from lasix but continues to have leg edema and dyspnea. Will give more IV lasix and reassess for discharge in am. Will ask SW to check with her home O2 company to make sure she has all supplies. Also encouraged her to keep monitoring her sats at home.      Charlie Fowler

## 2019-06-18 NOTE — DISCHARGE SUMMARY
INTERNAL MEDICINE DEPARTMENT AT [address] [place] [address]  DISCHARGE SUMMARY    Patient ID: Kacieelizabelenard Bastian                                             Discharge Date: 6/19/2019   Patient's PCP: Mechelle Leyva MD                                          Discharge Physician: Nancy Barney MD  Admit Date: 6/16/2019   Admitting Physician: Peggy Shanks MD    PROBLEMS DURING HOSPITALIZATION:  Patient Active Problem List   Diagnosis    Anemia       DISCHARGE DIAGNOSES:  1- Anemia likely 2/2 recent GI blood loss   2- HFpEF exacerbation in setting of severe anemia  3- DM    Hospital Course:    Ms. Gurdeep Langley is a 67 yo F with PMHx significant for recent LGIB, DM, HLD, HFrEF (ECHO 8/2018: EF 65-70%, G1DD), and hypothyroidism who presents with SOB and lower extremity edema over the last 2 weeks, progressive in nature. Of note, patient reported recent history of BRBPR and dark clots approximately 2 weeks prior to presentation to our hospital. She underwent c-scope and EGD on 6/13 at outside facility which indicated 2 colon polyps, internal hemorrhoids, high grade esophogeal stricture (chronic) and possible eosinophilic esophagitis, and severe diverticulosis. No obvious source of bleeding identified. Hemoglobin at the time was 10.2     While being worked-up in the ED, patient was found to have a hemoglobin of 6.7. BNP > 1100. CXR small left pleural effusion. She received 2 units of pRBCs with improvement of hemoglobin to 8.9 --> 8.2-->7.9 GI consulted, no intervention necessary as no active bleeding and FOBT negative. Found to be iron deficient and was given IV iron. Additionally, patient underwent ECHO due to concerns for worsening HF in setting of leg swelling and crackles heard on exam. ECHO demonstrated EF 55-60% with G2DD (was previously G1DD). Patient received IV lasix with excellent response and will be discharged with PO lasix and will follow-up with cardiologist as outpatient.                Physical Exam:  /60 Pulse 85   Temp 98.4 °F (36.9 °C) (Oral)   Resp 18   Ht 5' 7\" (1.702 m)   Wt 182 lb 8.7 oz (82.8 kg)   SpO2 92%   BMI 28.59 kg/m²   General appearance: alert, appears stated age and cooperative  Head: Normocephalic, without obvious abnormality, atraumatic  Eyes: conjunctivae/corneas clear. PERRL, EOM's intact. Fundi benign. Ears: normal TM's and external ear canals both ears  Nose: Nares normal. Septum midline. Mucosa normal. No drainage or sinus tenderness.   Throat: lips, mucosa, and tongue normal; teeth and gums normal  Neck: no adenopathy, no carotid bruit, no JVD, supple, symmetrical, trachea midline and thyroid not enlarged, symmetric, no tenderness/mass/nodules  Lungs: clear to auscultation bilaterally  Heart: regular rate and rhythm, S1, S2 normal, no murmur, click, rub or gallop  Abdomen: soft, non-tender; bowel sounds normal; no masses,  no organomegaly  Extremities: extremities normal, atraumatic, some lower extremity edema, improved since admission  Neurologic: Grossly normal    Consults: GI   Significant Diagnostic Studies:  TTE ECHO of heart  Treatments: 2 units pRBCs, IV iron, IV lasix   Disposition: home  Discharged Condition: Stable  Follow Up: Primary Care Physician in one week    DISCHARGE MEDICATION:     Medication List      ASK your doctor about these medications    ALPRAZolam 0.5 MG tablet  Commonly known as:  XANAX     aspirin 81 MG tablet     citalopram 20 MG tablet  Commonly known as:  CELEXA     dicyclomine 20 MG tablet  Commonly known as:  BENTYL     levothyroxine 137 MCG tablet  Commonly known as:  SYNTHROID     metFORMIN 1000 MG tablet  Commonly known as:  GLUCOPHAGE     pantoprazole 40 MG tablet  Commonly known as:  PROTONIX     simvastatin 20 MG tablet  Commonly known as:  ZOCOR     VITAMIN D2 PO          Activity: activity as tolerated  Diet: diabetic diet  Wound Care: none needed    Time Spent on discharge is more than 30 minutes    Signed:  Mehdi Greer MD   6/18/2019      Patient seen and examined, plan of care discussed with residents. Agree with their assessment and plan with following addendum:  Briefly, patient was admitted with shortness of breath and weakness. She was found to have anemia and acute diast CHF. Responded to transfusion and diuresis. No GI bleed bu gross examination of stools and FOBT negative. Evaluated by GI. Hgb stable. Will go home on low dose lasix, iron tabs, follow up with PCP. Time spent on discharge more than 30 minutes.        Charlie Fowler

## 2019-06-18 NOTE — PLAN OF CARE
Problem: Falls - Risk of:  Goal: Will remain free from falls  Description  Will remain free from falls  Outcome: Ongoing  Note:   Patient is a medium fall risk, falls precautions in place. Patient steady upon ambulation with cane. Problem: HEMODYNAMIC STATUS  Goal: Patient has stable vital signs and fluid balance  Outcome: Ongoing  Note:   VSS on RA. Patient on IV lasix, adequate output.

## 2019-06-19 LAB
ANION GAP SERPL CALCULATED.3IONS-SCNC: 8 MMOL/L (ref 3–16)
BASOPHILS ABSOLUTE: 0.1 K/UL (ref 0–0.2)
BASOPHILS RELATIVE PERCENT: 1.3 %
BUN BLDV-MCNC: 8 MG/DL (ref 7–20)
CALCIUM SERPL-MCNC: 7.9 MG/DL (ref 8.3–10.6)
CHLORIDE BLD-SCNC: 102 MMOL/L (ref 99–110)
CO2: 29 MMOL/L (ref 21–32)
CREAT SERPL-MCNC: 0.5 MG/DL (ref 0.6–1.2)
EKG ATRIAL RATE: 71 BPM
EKG DIAGNOSIS: NORMAL
EKG P AXIS: 55 DEGREES
EKG P-R INTERVAL: 156 MS
EKG Q-T INTERVAL: 396 MS
EKG QRS DURATION: 66 MS
EKG QTC CALCULATION (BAZETT): 430 MS
EKG R AXIS: 28 DEGREES
EKG T AXIS: 25 DEGREES
EKG VENTRICULAR RATE: 71 BPM
EOSINOPHILS ABSOLUTE: 0.2 K/UL (ref 0–0.6)
EOSINOPHILS RELATIVE PERCENT: 3.2 %
GFR AFRICAN AMERICAN: >60
GFR NON-AFRICAN AMERICAN: >60
GLUCOSE BLD-MCNC: 119 MG/DL (ref 70–99)
GLUCOSE BLD-MCNC: 85 MG/DL (ref 70–99)
GLUCOSE BLD-MCNC: 95 MG/DL (ref 70–99)
GLUCOSE BLD-MCNC: 99 MG/DL (ref 70–99)
HCT VFR BLD CALC: 24.6 % (ref 36–48)
HCT VFR BLD CALC: 25.4 % (ref 36–48)
HEMOGLOBIN: 7.9 G/DL (ref 12–16)
HEMOGLOBIN: 8.2 G/DL (ref 12–16)
LYMPHOCYTES ABSOLUTE: 1.4 K/UL (ref 1–5.1)
LYMPHOCYTES RELATIVE PERCENT: 21 %
MCH RBC QN AUTO: 29.1 PG (ref 26–34)
MCHC RBC AUTO-ENTMCNC: 32.2 G/DL (ref 31–36)
MCV RBC AUTO: 90.6 FL (ref 80–100)
MONOCYTES ABSOLUTE: 0.8 K/UL (ref 0–1.3)
MONOCYTES RELATIVE PERCENT: 11.9 %
NEUTROPHILS ABSOLUTE: 4.3 K/UL (ref 1.7–7.7)
NEUTROPHILS RELATIVE PERCENT: 62.6 %
PDW BLD-RTO: 21.3 % (ref 12.4–15.4)
PERFORMED ON: ABNORMAL
PERFORMED ON: NORMAL
PERFORMED ON: NORMAL
PLATELET # BLD: 390 K/UL (ref 135–450)
PMV BLD AUTO: 8 FL (ref 5–10.5)
POTASSIUM REFLEX MAGNESIUM: 3.7 MMOL/L (ref 3.5–5.1)
RBC # BLD: 2.72 M/UL (ref 4–5.2)
SODIUM BLD-SCNC: 139 MMOL/L (ref 136–145)
WBC # BLD: 6.8 K/UL (ref 4–11)

## 2019-06-19 PROCEDURE — 6370000000 HC RX 637 (ALT 250 FOR IP): Performed by: STUDENT IN AN ORGANIZED HEALTH CARE EDUCATION/TRAINING PROGRAM

## 2019-06-19 PROCEDURE — 6370000000 HC RX 637 (ALT 250 FOR IP): Performed by: INTERNAL MEDICINE

## 2019-06-19 PROCEDURE — 85025 COMPLETE CBC W/AUTO DIFF WBC: CPT

## 2019-06-19 PROCEDURE — 80048 BASIC METABOLIC PNL TOTAL CA: CPT

## 2019-06-19 PROCEDURE — 97530 THERAPEUTIC ACTIVITIES: CPT

## 2019-06-19 PROCEDURE — 2580000003 HC RX 258: Performed by: STUDENT IN AN ORGANIZED HEALTH CARE EDUCATION/TRAINING PROGRAM

## 2019-06-19 PROCEDURE — 1200000000 HC SEMI PRIVATE

## 2019-06-19 PROCEDURE — 85018 HEMOGLOBIN: CPT

## 2019-06-19 PROCEDURE — 36415 COLL VENOUS BLD VENIPUNCTURE: CPT

## 2019-06-19 PROCEDURE — 93010 ELECTROCARDIOGRAM REPORT: CPT | Performed by: INTERNAL MEDICINE

## 2019-06-19 PROCEDURE — 85014 HEMATOCRIT: CPT

## 2019-06-19 PROCEDURE — 6360000002 HC RX W HCPCS: Performed by: STUDENT IN AN ORGANIZED HEALTH CARE EDUCATION/TRAINING PROGRAM

## 2019-06-19 RX ORDER — FUROSEMIDE 40 MG/1
40 TABLET ORAL DAILY
Status: DISCONTINUED | OUTPATIENT
Start: 2019-06-20 | End: 2019-06-20 | Stop reason: HOSPADM

## 2019-06-19 RX ORDER — FUROSEMIDE 40 MG/1
40 TABLET ORAL DAILY
Qty: 60 TABLET | Refills: 3 | Status: SHIPPED | OUTPATIENT
Start: 2019-06-19

## 2019-06-19 RX ORDER — FERROUS SULFATE 325(65) MG
325 TABLET ORAL 2 TIMES DAILY
Qty: 180 TABLET | Refills: 3 | Status: SHIPPED | OUTPATIENT
Start: 2019-06-19

## 2019-06-19 RX ADMIN — IRON SUCROSE 300 MG: 20 INJECTION, SOLUTION INTRAVENOUS at 08:37

## 2019-06-19 RX ADMIN — LEVOTHYROXINE SODIUM 137 MCG: 137 TABLET ORAL at 05:37

## 2019-06-19 RX ADMIN — ALPRAZOLAM 0.5 MG: 0.5 TABLET ORAL at 23:59

## 2019-06-19 RX ADMIN — SIMVASTATIN 20 MG: 20 TABLET, FILM COATED ORAL at 21:16

## 2019-06-19 RX ADMIN — Medication 10 ML: at 21:16

## 2019-06-19 RX ADMIN — CITALOPRAM HYDROBROMIDE 20 MG: 20 TABLET ORAL at 11:29

## 2019-06-19 RX ADMIN — PANTOPRAZOLE SODIUM 40 MG: 40 TABLET, DELAYED RELEASE ORAL at 05:37

## 2019-06-19 ASSESSMENT — PAIN SCALES - GENERAL
PAINLEVEL_OUTOF10: 0

## 2019-06-19 NOTE — CARE COORDINATION
[address] Transitions Interview     2019    Patient: Hillary Winter  Patient : 1945   MRN: [id number]   Reason for Admission:  Anemia / CHF  RARS: Readmission Risk Score: 12         Spoke with: Hillary Winter      Readmission Risk  Patient Active Problem List   Diagnosis    Anemia       Inpatient Assessment  Care Transitions Summary    Care Transitions Inpatient Review  Medication Review  Are you able to afford your medications?:  Yes  How often do you have difficulty taking your medications?:  I always take them as prescribed. Housing Review  Who do you live with?:  Alone  Are you an active caregiver in your home?:  No  Social Support  Durable Medical Equipment  Patient DME:  Tub transfer bench, Straight cane, Walker  Functional Review  Ability handle personal hygiene needs (bathing/dressing/grooming): Independent  Ability to manage medications: Independent  Ability to prepare food:  Needs Assistance  Ability to maintain home (clean home, laundry):  Needs Assistance  Ability to drive and/or has transportation:  Independent  Ability to do shopping:  Needs Assistance  Ability to manage finances: Independent  Hearing and Vision  Visual Impairment:  None  Hearing Impairment:  None  Care Transitions Interventions     Other Services:  Completed (Comment: COA)        Summary  Patient/Responsible Party informed about the [facility] Medicare Initiative. Explained program and provided them with [address] Notification Letter. CTC spoke to the Pt who states that she lives in a apartment on one level with level entryway. Pt reports having DME (see list) and is able to bathe herself, manage her mediations, and provide transportation to and from doctor's appts. Pt states she needs assistance with cooking, housekeeping, laundry, and shopping. CTC explained COA services and Pt declined CTC completing a referral because she is in the process of moving.   Pt states that she plans to move into the [place] [facility] before the end of July. Pt did request a COA brochure and CTC advised she would bring one back to the Pt. [facility] explained and provided the Pt with a copy of the HF Zone Tool sheet and Pt denies any questions. Pt states that she does not have a scale and isn't sure when she will be able to purchase one d/t \"having a lot going on right now\". [facility] also explained and provided the Pt with a copy of the NH Predict Tool that recommends Pt d/c home with César Tobar. Pt plans to return home with services from [facility]. Pt denies any additional needs at this time and is agreeable with calls at this time but stated that she may not want the calls for the entire 90 days.      Follow Up  Future Appointments   Date Time Provider Carl Wilson   6/25/2019 10:00 AM PRECIOUS [facility] - CNP [facility]       Health Maintenance  Health Maintenance Due   Topic Date Due    Lipid screen  09/11/1985    Breast cancer screen  09/11/1995    DEXA (modify frequency per FRAX score)  09/11/2010       Blossom Wall RN

## 2019-06-19 NOTE — PROGRESS NOTES
Occupational Therapy  Facility/Department: [place] [address] [facility]  Daily Treatment Note  NAME: Charanjit Hadley  : 1945  MRN: [id number]    Date of Service: 2019    Discharge Recommendations: Charanjit Hadley scored a 21/24 on the AM-PAC ADL Inpatient form. Current research shows that an AM-PAC score of 18 or greater is typically associated with a discharge to the patient's home setting. Based on the patients AM-PAC score and their current ADL deficits, it is recommended that the patient have 2-3 sessions per week of Occupational Therapy at d/c to increase the patients independence. S Level 2  OT Equipment Recommendations  Equipment Needed: Yes  Other: raised toilet seat - pt reports wants to obtain     Assessment   Performance deficits / Impairments: Decreased functional mobility ; Decreased ADL status; Decreased safe awareness;Decreased endurance  Assessment: Pt admit from home reports she is typically very independent with self-care and functional mobility. Pt is now requiring increased assistance with functional activity however, disregards current deficits. Pt reports that she has spoken to daughters about taking up residency at nearby [facility] facility that can offer assistance when needed. Recommend 24 hr assistance continued OT services at d/c if pt homebound to increase safety awareness and improve functional status. Will continue to assess during admission. Treatment Diagnosis: impaired ADLs / functional transfers / poor endurance 2/2 Anemia   Prognosis: Fair;Good  Patient Education: Role of OT / home safety / activity promotion - verb understanding - reinforce as needed   REQUIRES OT FOLLOW UP: Yes  Activity Tolerance  Activity Tolerance: Patient limited by fatigue;Treatment limited secondary to agitation  Activity Tolerance: despite encouragement for continued participation with therapy pt request to go back to room and rest.   Safety Devices  Type of devices: Call light within reach;Nurse notified; Left in bed(RN present at end of session, aware bed alarm was not activitated.)         Patient Diagnosis(es): The primary encounter diagnosis was Acute blood loss anemia. A diagnosis of Acute congestive heart failure, unspecified heart failure type [facility]) was also pertinent to this visit. has a past medical history of Diabetes mellitus ([facility] [address] Hyperlipidemia, Pulmonary fibrosis ([facility] [address] and Thyroid disease. has a past surgical history that includes Upper gastrointestinal endoscopy and Colonoscopy. Restrictions  Restrictions/Precautions  Restrictions/Precautions: Fall Risk  Position Activity Restriction  Other position/activity restrictions: up with assistance     Subjective   General  Chart Reviewed: Yes  Additional Pertinent Hx: Admit 6/16 with weakness/ anemia    1 unit PRBC   GI consult                        PMHX: DM,HLD,Pulmonary fibrosis  Family / Caregiver Present: No  Diagnosis: Weakness / Anemia   Subjective  Subjective: Pt seated EOB upon entry; compliant with therapy slightly agitated. \" I am fully aware of OT thank you\"  Vital Signs  Patient Currently in Pain: Denies     Orientation  Orientation  Overall Orientation Status: Within Functional Limits     Objective    ADL  Feeding: Setup; Beverage management  Grooming: Setup(seated EOB)  Additional Comments: declines the need to complete further ADLs     Balance  Sitting Balance: Independent  Standing Balance: (declines use of cane on this date.)  Standing Balance  Time: ~1 min + 6 min   Activity: transfers/functional mobility   Comment: requires heavy encouragement to participate with OOB activity. Functional Mobility  Activity: Other; To/from bathroom(and in pickens)  Assist Level: Contact guard assistance  Functional Mobility Comments: Pt requires encouragement to ambulate further in hallway, no overt LOB w/o use of cane, however anticpate increased stability with use.    Toilet Transfers  Toilet - Technique: Ambulating  Equipment Used: Standard toilet  Toilet Transfer: Modified independent;Supervision  Toilet Transfers Comments: declines toileting once in bathroom. Transfers  Sit to stand: Contact guard assistance  Stand to sit: Stand by assistance     Cognition  Overall Cognitive Status: Exceptions  Arousal/Alertness: Appropriate responses to stimuli  Following Commands: Follows one step commands with increased time  Attention Span: Attends with cues to redirect  Safety Judgement: Decreased awareness of need for assistance  Insights: Decreased awareness of deficits  Cognition Comment: Pt increasingly agitated on this date when asked about any concerns she would have if possibly d/c home, and appears to disreguard increased need for assistance. Plan   Plan  Times per week: 2-5x  Times per day: Daily  Current Treatment Recommendations: Endurance Training, Equipment Evaluation, Education, & procurement, Patient/Caregiver Education & Training, Self-Care / ADL, Safety Education & Training    AM-PAC Score  AM-[facility] Inpatient Daily Activity Raw Score: 21 (06/19/19 1225)  AM-PAC Inpatient ADL T-Scale Score : 44.27 (06/19/19 1225)  ADL Inpatient CMS 0-100% Score: 32.79 (06/19/19 1225)  ADL Inpatient CMS G-Code Modifier : Shayy Mcguire (06/19/19 1225)    Goals  Short term goals  Time Frame for Short term goals: at d/c   Short term goal 1: Stance x 8 mins with Supervision for ADLs  - Not met  Short term goal 2: LE Dressing with Mod independence - Not met  Short term goal 3: Chair pushups x 5 reps x 2 sets with supervision - Not met  Short term goal 4: Verb 2  to use at home - Not met  Patient Goals   Patient goals : go home and feel better        Therapy Time   Individual Concurrent Group Co-treatment   Time In 1013         Time Out 1053         Minutes 40         Timed Code Treatment Minutes: 40  Total Treatment Minutes: 40    If pt discharges prior to next treatment session, this note will serve as discharge summary.      Oswaldo Horton, Student [employer]    Occupational

## 2019-06-19 NOTE — PROGRESS NOTES
Type and Reason for Visit:  Initial, Patient Education, Consult(CHF diet education)    Diet Orders / Intake / Nutrition Support  Current diet/supplement order: DIET CARB CONTROL; Low Sodium (2 GM)       Subjective:  RD educated patient on CHF dietary guidelines including low sodium diet, fluid restriction, and the importance of monitoring daily weights. RD discussed with patient shopping/cooking tips for low sodium diet, foods recommended/not recommended, and food times included in fluid restriction. Handout provided to patient, she states that she follows a low sodium/carbohydrate diet at home already and is not on a fluid restriction. Pt with good understanding of diet, will sign off for now and see at LOS assessment.      Instructed the Patient on:   [x] Low Sodium foods  [x] Sodium free  [x] Fluids   [x] Other: daily weights    Educational Materials Provided:   [x] [facility] Failure Education folder- Nutrition Therapy     -General Diet Recommendations: 2gm sodium diet, fluid restriction per MD     Time spent with patient: 15 minutes     Hilary Mendenhall, [address] [place] [address],   Office:  [phone]

## 2019-06-19 NOTE — CARE COORDINATION
[facility] returned to Pt's room and provided the Pt with a COA brochure. Pt thanked [facility] for her assistance and denies any additional needs. LIBERTAD RamirezN, RN  Care Transition Coordinator  Contact Number:  [phone]

## 2019-06-19 NOTE — DISCHARGE INSTR - COC
Continuity of Care Form    Patient Name: Amari Valadez   :  1945  MRN:  [id number]    Admit date:  2019  Discharge date:  19

## 2019-06-19 NOTE — PLAN OF CARE
Problem: OXYGENATION/RESPIRATORY FUNCTION  Goal: Patient will achieve/maintain normal respiratory rate/effort  Description  Respiratory rate and effort will be within normal limits for the patient  Outcome: Ongoing   Pt has maintained SpO2 > 92 % on room air overnight with respiratory rate 18-20 and no complaints of shortness of breath or dyspnea. Will continue to monitor.

## 2019-06-20 VITALS
OXYGEN SATURATION: 92 % | WEIGHT: 179.23 LBS | HEIGHT: 67 IN | HEART RATE: 82 BPM | RESPIRATION RATE: 16 BRPM | DIASTOLIC BLOOD PRESSURE: 56 MMHG | TEMPERATURE: 98.3 F | BODY MASS INDEX: 28.13 KG/M2 | SYSTOLIC BLOOD PRESSURE: 105 MMHG

## 2019-06-20 LAB
ANION GAP SERPL CALCULATED.3IONS-SCNC: 11 MMOL/L (ref 3–16)
BASOPHILS ABSOLUTE: 0.1 K/UL (ref 0–0.2)
BASOPHILS RELATIVE PERCENT: 0.9 %
BUN BLDV-MCNC: 9 MG/DL (ref 7–20)
CALCIUM SERPL-MCNC: 8 MG/DL (ref 8.3–10.6)
CHLORIDE BLD-SCNC: 104 MMOL/L (ref 99–110)
CO2: 25 MMOL/L (ref 21–32)
CREAT SERPL-MCNC: <0.5 MG/DL (ref 0.6–1.2)
EOSINOPHILS ABSOLUTE: 0.2 K/UL (ref 0–0.6)
EOSINOPHILS RELATIVE PERCENT: 2.6 %
GFR AFRICAN AMERICAN: >60
GFR NON-AFRICAN AMERICAN: >60
GLUCOSE BLD-MCNC: 85 MG/DL (ref 70–99)
GLUCOSE BLD-MCNC: 95 MG/DL (ref 70–99)
HCT VFR BLD CALC: 25.7 % (ref 36–48)
HEMOGLOBIN: 8.2 G/DL (ref 12–16)
LYMPHOCYTES ABSOLUTE: 1.2 K/UL (ref 1–5.1)
LYMPHOCYTES RELATIVE PERCENT: 16.8 %
MCH RBC QN AUTO: 29.4 PG (ref 26–34)
MCHC RBC AUTO-ENTMCNC: 31.9 G/DL (ref 31–36)
MCV RBC AUTO: 92.4 FL (ref 80–100)
MONOCYTES ABSOLUTE: 0.8 K/UL (ref 0–1.3)
MONOCYTES RELATIVE PERCENT: 10.2 %
NEUTROPHILS ABSOLUTE: 5.1 K/UL (ref 1.7–7.7)
NEUTROPHILS RELATIVE PERCENT: 69.5 %
PDW BLD-RTO: 21.5 % (ref 12.4–15.4)
PERFORMED ON: NORMAL
PLATELET # BLD: 376 K/UL (ref 135–450)
PMV BLD AUTO: 8 FL (ref 5–10.5)
POTASSIUM REFLEX MAGNESIUM: 3.8 MMOL/L (ref 3.5–5.1)
RBC # BLD: 2.78 M/UL (ref 4–5.2)
SODIUM BLD-SCNC: 140 MMOL/L (ref 136–145)
WBC # BLD: 7.4 K/UL (ref 4–11)

## 2019-06-20 PROCEDURE — 6370000000 HC RX 637 (ALT 250 FOR IP): Performed by: STUDENT IN AN ORGANIZED HEALTH CARE EDUCATION/TRAINING PROGRAM

## 2019-06-20 PROCEDURE — 36415 COLL VENOUS BLD VENIPUNCTURE: CPT

## 2019-06-20 PROCEDURE — 6370000000 HC RX 637 (ALT 250 FOR IP): Performed by: INTERNAL MEDICINE

## 2019-06-20 PROCEDURE — 80048 BASIC METABOLIC PNL TOTAL CA: CPT

## 2019-06-20 PROCEDURE — 85025 COMPLETE CBC W/AUTO DIFF WBC: CPT

## 2019-06-20 PROCEDURE — 2580000003 HC RX 258: Performed by: STUDENT IN AN ORGANIZED HEALTH CARE EDUCATION/TRAINING PROGRAM

## 2019-06-20 RX ADMIN — FUROSEMIDE 40 MG: 40 TABLET ORAL at 08:21

## 2019-06-20 RX ADMIN — Medication 10 ML: at 08:21

## 2019-06-20 RX ADMIN — LEVOTHYROXINE SODIUM 137 MCG: 137 TABLET ORAL at 06:44

## 2019-06-20 RX ADMIN — CITALOPRAM HYDROBROMIDE 20 MG: 20 TABLET ORAL at 08:21

## 2019-06-20 RX ADMIN — PANTOPRAZOLE SODIUM 40 MG: 40 TABLET, DELAYED RELEASE ORAL at 06:44

## 2019-06-20 ASSESSMENT — PAIN SCALES - GENERAL
PAINLEVEL_OUTOF10: 0
PAINLEVEL_OUTOF10: 0

## 2019-06-20 NOTE — PLAN OF CARE
Problem: Falls - Risk of:  Goal: Will remain free from falls  Description  Will remain free from falls  Note:   Pt is a Low Fall Risk. See Neymar Strong Fall Risk Score. Pt bed in low position, bed wheels locked, non-skid socks in use, and 2/4 side rails up. Pt up as tolerated. Pt denies dizziness, SOB, or pain at this time. Call light and belongings left within reach. Pt encouraged to call for assistance. Will continue with hourly rounds for PO intake, pain needs, toileting, and repositioning as needed. No needs expressed at this time. Problem: Pain:  Goal: Pain level will decrease  Description  Pain level will decrease  Note:   Pt denies pain at this time. Will continue to monitor. Problem: OXYGENATION/RESPIRATORY FUNCTION  Goal: Patient will maintain patent airway  Note:   SpO2 level 92% on room air. Lungs are clear/diminished to auscultation. Pt denies dizziness, SOB, or pain at this time. Trace edema present in BLE; pt encouraged to elevated legs while in bed/chair. Daily medications given as ordered. I/O being monitored closely. Will continue to monitor and report changes in condition to physician.

## 2019-06-20 NOTE — DISCHARGE SUMMARY
INTERNAL MEDICINE DEPARTMENT AT [address] [place] [address]  DISCHARGE SUMMARY    Patient ID: Ying Bonner                                             Discharge Date: 6/20/2019   Patient's PCP: Ken Kaur MD                                          Discharge Physician: Mehdi Greer MD  Admit Date: 6/16/2019   Admitting Physician: Chelo Madison MD    PROBLEMS DURING HOSPITALIZATION:  Patient Active Problem List   Diagnosis    Anemia     DISCHARGE DIAGNOSES:  1- Anemia likely 2/2 recent GI blood loss   2- HFpEF exacerbation in setting of severe anemia  3- DM     Hospital Course:    Ms. Tung Valderrama is a 67 yo F with PMHx significant for recent LGIB, DM, HLD, HFrEF (ECHO 8/2018: EF 65-70%, G1DD), and hypothyroidism who presents with SOB and lower extremity edema over the last 2 weeks, progressive in nature. Of note, patient reported recent history of BRBPR and dark clots approximately 2 weeks prior to presentation to our hospital. She underwent c-scope and EGD on 6/13 at outside facility which indicated 2 colon polyps, internal hemorrhoids, high grade esophogeal stricture (chronic) and possible eosinophilic esophagitis, and severe diverticulosis. No obvious source of bleeding identified. Hemoglobin at the time was 10.2      While being worked-up in the ED, patient was found to have a hemoglobin of 6.7. BNP > 1100. CXR small left pleural effusion. She received 2 units of pRBCs with improvement of hemoglobin to 8.9 --> 8.2-->7.9 GI consulted, no intervention necessary as no active bleeding and FOBT negative. Found to be iron deficient and was given IV iron.      Additionally, patient underwent ECHO due to concerns for worsening HF in setting of leg swelling and crackles heard on exam. ECHO demonstrated EF 55-60% with G2DD (was previously G1DD).  Patient received IV lasix with excellent response and will be discharged with PO lasix and will follow-up with cardiologist as outpatient.           Physical Exam:  BP (!) 105/56   Pulse 82   Temp 98.3 °F (36.8 °C) (Oral)   Resp 16   Ht 5' 7\" (1.702 m)   Wt 179 lb 3.7 oz (81.3 kg)   SpO2 92%   BMI 28.07 kg/m²   General appearance: alert, appears stated age and cooperative  Head: Normocephalic, without obvious abnormality, atraumatic  Eyes: conjunctivae/corneas clear. PERRL, EOM's intact. Fundi benign. Ears: normal TM's and external ear canals both ears  Nose: Nares normal. Septum midline. Mucosa normal. No drainage or sinus tenderness.   Throat: lips, mucosa, and tongue normal; teeth and gums normal  Neck: no adenopathy, no carotid bruit, no JVD, supple, symmetrical, trachea midline and thyroid not enlarged, symmetric, no tenderness/mass/nodules  Lungs: clear to auscultation bilaterally  Heart: regular rate and rhythm, S1, S2 normal, no murmur, click, rub or gallop  Abdomen: soft, non-tender; bowel sounds normal; no masses,  no organomegaly  Extremities: extremities normal, atraumatic, no cyanosis or edema  Neurologic: Grossly normal    Consults: GI  Significant Diagnostic Studies:  FOBT, ECHO   Treatments: pRBCs, IV lasix   Disposition: home  Discharged Condition: Stable  Follow Up: Primary Care Physician in one week    DISCHARGE MEDICATION:     Medication List      START taking these medications    ferrous sulfate 325 (65 Fe) MG tablet  Take 1 tablet by mouth 2 times daily  Notes to patient:  Ferrous Sulfate  (Iron)  USE--Iron supplement  SIDE EFFECTS--  Upset stomach, constipation, dark colored stools     furosemide 40 MG tablet  Commonly known as:  LASIX  Take 1 tablet by mouth daily  Notes to patient:  Furosemide  (Lasix)  USE--  Helps body get rid of extra fluid; \"water pill\"  SIDE EFFECTS--  Increased urination, dizziness        CONTINUE taking these medications    ALPRAZolam 0.5 MG tablet  Commonly known as:  XANAX     aspirin 81 MG tablet     citalopram 20 MG tablet  Commonly known as:  CELEXA     dicyclomine 20 MG tablet  Commonly known as:  BENTYL     levothyroxine 137 MCG tablet  Commonly known as:  SYNTHROID     metFORMIN 1000 MG tablet  Commonly known as:  GLUCOPHAGE     pantoprazole 40 MG tablet  Commonly known as:  PROTONIX     simvastatin 20 MG tablet  Commonly known as:  ZOCOR     VITAMIN D2 PO           Where to Get Your Medications      You can get these medications from any pharmacy    Bring a paper prescription for each of these medications  · ferrous sulfate 325 (65 Fe) MG tablet  · furosemide 40 MG tablet       Activity: activity as tolerated  Diet: diabetic diet  Wound Care: none needed    Time Spent on discharge is more than 30 minutes    Signed:  Sara Jones MD   6/20/2019         Patient seen and examined, plan of care discussed with residents. Agree with their assessment and plan with following addendum:  Briefly, patient was admitted with dyspnea due to symptomatic anemia and diast CHF. She improved with transfusion, IV iron and diuresis. Hgb stable and no evidence of active bleeding. Patient declined repeat endoscopic evaluation. Advised to follow up with her PCP and follow her oxygen prescription from her pulmonologist. Time spent on discharge more than 30 minutes.        Josue Bangura

## 2019-06-21 ENCOUNTER — CARE COORDINATION (OUTPATIENT)
Dept: CASE MANAGEMENT | Age: 74
End: 2019-06-21

## 2019-06-21 NOTE — CARE COORDINATION
[facility] Transitions Initial Follow Up Call    Call within 2 business days of discharge: Yes    Patient: Sharlene Green  Patient : 1945   MRN: [id number]   Reason for Admission:  Anemia / HF   Discharge Date: 19 RARS: Readmission Risk Score: 13      Last Discharge [facility]       Complaint Diagnosis Description Type Department Provider    19 trouble breathing Acute blood loss anemia . .. ED to Hosp-Admission (Discharged) (ADMITTED) [facility] 4 PCU Delfina Negrete MD; Daniel. .. Spoke with: Tiburc[address]: [facility] [employer]      Non-face-to-face services provided:  Obtained and reviewed discharge summary and/or continuity of care documents  Education of patient/family/caregiver/guardian to support self-management-   Assessment and support for treatment adherence and medication management-     Care Transitions 24 Hour Call    Do you have any ongoing symptoms?:  Yes  Patient-reported symptoms:  Other (Comment: LE edema )  Interventions for patient-reported symptoms:  Other (Comment: Continue taking meds as prescribed, keep / schedule appts, s/s that require med attn)  Do you have a copy of your discharge instructions?:  Yes  Do you have all of your prescriptions and are they filled?:  Yes  Have you been contacted by a [facility] Pharmacist?:  No  Have you scheduled your follow up appointment?:  Yes  Were you discharged with any Home Care or Post Acute Services:  Yes  Post Acute Services:  Home Health  Patient DME:  Tub transfer bench, Straight cane, Walker  Do you have support at home?:  Alone  Do you feel like you have everything you need to keep you well at home?:  Yes  Are you an active caregiver in your home?:  No  Care Transitions Interventions  No Identified Needs     Other Services:  Completed (Comment: COA)        Summary  CTC spoke to the Pt who denies fever, chills, nausea, vomiting, chest pain, SOB, cough, dizziness, feeling lightheaded, and heart palpitations.   Pt reports LE edema but states it is getting better. Pt declined to review meds with CTC stating she is on her way out. Pt did confirm she has two new meds and is taking and [facility] provided Pt education on constipation. [facility] encouraged Pt to being taking Miralax or stool softener and Pt stated \"ok\". Pt has appt with NP Ghazala Marquez on 6/25 and plans to schedule HFU. CTC provided education on s/s that require medical attention and when to seek medical attention. [facility] advised Pt of use urgent care or physicians 24 hr access line if assistance is needed after hours or on the weekend. Pt denies any needs or concerns and is agreeable with additional calls.     Follow Up  Future Appointments   Date Time Provider Carl Wilson   6/25/2019 10:00 AM PRECIOUS [place] - HILDA Bee RN

## 2021-06-28 ENCOUNTER — APPOINTMENT (OUTPATIENT)
Dept: GENERAL RADIOLOGY | Age: 76
End: 2021-06-28
Payer: MEDICARE

## 2021-06-28 ENCOUNTER — HOSPITAL ENCOUNTER (EMERGENCY)
Age: 76
Discharge: HOME | End: 2021-06-28
Attending: EMERGENCY MEDICINE
Payer: MEDICARE

## 2021-06-28 ENCOUNTER — APPOINTMENT (OUTPATIENT)
Dept: CT IMAGING | Age: 76
End: 2021-06-28
Payer: MEDICARE

## 2021-06-28 VITALS
HEART RATE: 87 BPM | RESPIRATION RATE: 18 BRPM | WEIGHT: 167 LBS | TEMPERATURE: 98 F | SYSTOLIC BLOOD PRESSURE: 103 MMHG | DIASTOLIC BLOOD PRESSURE: 46 MMHG | OXYGEN SATURATION: 90 % | BODY MASS INDEX: 25.31 KG/M2 | HEIGHT: 68 IN

## 2021-06-28 DIAGNOSIS — S00.83XA CONTUSION OF FOREHEAD, INITIAL ENCOUNTER: ICD-10-CM

## 2021-06-28 DIAGNOSIS — S42.202A CLOSED FRACTURE OF PROXIMAL END OF LEFT HUMERUS, UNSPECIFIED FRACTURE MORPHOLOGY, INITIAL ENCOUNTER: Primary | ICD-10-CM

## 2021-06-28 LAB
ABO/RH: NORMAL
ANION GAP SERPL CALCULATED.3IONS-SCNC: 17 MMOL/L (ref 3–16)
ANTIBODY SCREEN: NORMAL
BASOPHILS ABSOLUTE: 0 K/UL (ref 0–0.2)
BASOPHILS RELATIVE PERCENT: 0.5 %
BUN BLDV-MCNC: 8 MG/DL (ref 7–20)
CALCIUM SERPL-MCNC: 9 MG/DL (ref 8.3–10.6)
CHLORIDE BLD-SCNC: 103 MMOL/L (ref 99–110)
CO2: 21 MMOL/L (ref 21–32)
CREAT SERPL-MCNC: <0.5 MG/DL (ref 0.6–1.2)
EKG ATRIAL RATE: 80 BPM
EKG DIAGNOSIS: NORMAL
EKG P AXIS: 82 DEGREES
EKG P-R INTERVAL: 192 MS
EKG Q-T INTERVAL: 392 MS
EKG QRS DURATION: 74 MS
EKG QTC CALCULATION (BAZETT): 452 MS
EKG R AXIS: 1 DEGREES
EKG T AXIS: 29 DEGREES
EKG VENTRICULAR RATE: 80 BPM
EOSINOPHILS ABSOLUTE: 0.1 K/UL (ref 0–0.6)
EOSINOPHILS RELATIVE PERCENT: 1.1 %
GFR AFRICAN AMERICAN: >60
GFR NON-AFRICAN AMERICAN: >60
GLUCOSE BLD-MCNC: 110 MG/DL (ref 70–99)
HCT VFR BLD CALC: 37.7 % (ref 36–48)
HEMOGLOBIN: 12.3 G/DL (ref 12–16)
LYMPHOCYTES ABSOLUTE: 0.8 K/UL (ref 1–5.1)
LYMPHOCYTES RELATIVE PERCENT: 9.6 %
MCH RBC QN AUTO: 29.6 PG (ref 26–34)
MCHC RBC AUTO-ENTMCNC: 32.5 G/DL (ref 31–36)
MCV RBC AUTO: 91.1 FL (ref 80–100)
MONOCYTES ABSOLUTE: 0.3 K/UL (ref 0–1.3)
MONOCYTES RELATIVE PERCENT: 3.7 %
NEUTROPHILS ABSOLUTE: 7.5 K/UL (ref 1.7–7.7)
NEUTROPHILS RELATIVE PERCENT: 85.1 %
PDW BLD-RTO: 15.5 % (ref 12.4–15.4)
PLATELET # BLD: 277 K/UL (ref 135–450)
PMV BLD AUTO: 8.5 FL (ref 5–10.5)
POTASSIUM SERPL-SCNC: 4.2 MMOL/L (ref 3.5–5.1)
RBC # BLD: 4.14 M/UL (ref 4–5.2)
SODIUM BLD-SCNC: 141 MMOL/L (ref 136–145)
WBC # BLD: 8.8 K/UL (ref 4–11)

## 2021-06-28 PROCEDURE — 96376 TX/PRO/DX INJ SAME DRUG ADON: CPT

## 2021-06-28 PROCEDURE — 73200 CT UPPER EXTREMITY W/O DYE: CPT

## 2021-06-28 PROCEDURE — 73030 X-RAY EXAM OF SHOULDER: CPT

## 2021-06-28 PROCEDURE — 86901 BLOOD TYPING SEROLOGIC RH(D): CPT

## 2021-06-28 PROCEDURE — 99283 EMERGENCY DEPT VISIT LOW MDM: CPT

## 2021-06-28 PROCEDURE — 97166 OT EVAL MOD COMPLEX 45 MIN: CPT

## 2021-06-28 PROCEDURE — 96374 THER/PROPH/DIAG INJ IV PUSH: CPT

## 2021-06-28 PROCEDURE — 6370000000 HC RX 637 (ALT 250 FOR IP): Performed by: EMERGENCY MEDICINE

## 2021-06-28 PROCEDURE — 80048 BASIC METABOLIC PNL TOTAL CA: CPT

## 2021-06-28 PROCEDURE — 93005 ELECTROCARDIOGRAM TRACING: CPT | Performed by: EMERGENCY MEDICINE

## 2021-06-28 PROCEDURE — 97530 THERAPEUTIC ACTIVITIES: CPT

## 2021-06-28 PROCEDURE — 96375 TX/PRO/DX INJ NEW DRUG ADDON: CPT

## 2021-06-28 PROCEDURE — 71045 X-RAY EXAM CHEST 1 VIEW: CPT

## 2021-06-28 PROCEDURE — 70450 CT HEAD/BRAIN W/O DYE: CPT

## 2021-06-28 PROCEDURE — 85025 COMPLETE CBC W/AUTO DIFF WBC: CPT

## 2021-06-28 PROCEDURE — 86850 RBC ANTIBODY SCREEN: CPT

## 2021-06-28 PROCEDURE — 97162 PT EVAL MOD COMPLEX 30 MIN: CPT

## 2021-06-28 PROCEDURE — 86900 BLOOD TYPING SEROLOGIC ABO: CPT

## 2021-06-28 PROCEDURE — 36415 COLL VENOUS BLD VENIPUNCTURE: CPT

## 2021-06-28 PROCEDURE — 6360000002 HC RX W HCPCS: Performed by: EMERGENCY MEDICINE

## 2021-06-28 PROCEDURE — 73560 X-RAY EXAM OF KNEE 1 OR 2: CPT

## 2021-06-28 PROCEDURE — 73060 X-RAY EXAM OF HUMERUS: CPT

## 2021-06-28 RX ORDER — HYDROCODONE BITARTRATE AND ACETAMINOPHEN 5; 325 MG/1; MG/1
1 TABLET ORAL ONCE
Status: COMPLETED | OUTPATIENT
Start: 2021-06-28 | End: 2021-06-28

## 2021-06-28 RX ORDER — FENTANYL CITRATE 50 UG/ML
50 INJECTION, SOLUTION INTRAMUSCULAR; INTRAVENOUS ONCE
Status: COMPLETED | OUTPATIENT
Start: 2021-06-28 | End: 2021-06-28

## 2021-06-28 RX ADMIN — HYDROCODONE BITARTRATE AND ACETAMINOPHEN 1 TABLET: 5; 325 TABLET ORAL at 16:04

## 2021-06-28 RX ADMIN — HYDROMORPHONE HYDROCHLORIDE 1 MG: 1 INJECTION, SOLUTION INTRAMUSCULAR; INTRAVENOUS; SUBCUTANEOUS at 12:40

## 2021-06-28 RX ADMIN — FENTANYL CITRATE 50 MCG: 50 INJECTION INTRAMUSCULAR; INTRAVENOUS at 04:54

## 2021-06-28 RX ADMIN — HYDROMORPHONE HYDROCHLORIDE 0.5 MG: 1 INJECTION, SOLUTION INTRAMUSCULAR; INTRAVENOUS; SUBCUTANEOUS at 08:17

## 2021-06-28 RX ADMIN — HYDROMORPHONE HYDROCHLORIDE 0.5 MG: 1 INJECTION, SOLUTION INTRAMUSCULAR; INTRAVENOUS; SUBCUTANEOUS at 05:46

## 2021-06-28 ASSESSMENT — PAIN DESCRIPTION - ORIENTATION: ORIENTATION: RIGHT;LEFT

## 2021-06-28 ASSESSMENT — PAIN SCALES - GENERAL
PAINLEVEL_OUTOF10: 0
PAINLEVEL_OUTOF10: 10
PAINLEVEL_OUTOF10: 8
PAINLEVEL_OUTOF10: 9
PAINLEVEL_OUTOF10: 10
PAINLEVEL_OUTOF10: 8

## 2021-06-28 ASSESSMENT — PAIN DESCRIPTION - PAIN TYPE: TYPE: ACUTE PAIN

## 2021-06-28 ASSESSMENT — PAIN DESCRIPTION - LOCATION: LOCATION: CHEST;SHOULDER;KNEE

## 2021-06-28 ASSESSMENT — PAIN DESCRIPTION - ONSET: ONSET: SUDDEN

## 2021-06-28 ASSESSMENT — PAIN DESCRIPTION - FREQUENCY: FREQUENCY: CONTINUOUS

## 2021-06-28 NOTE — ED PROVIDER NOTES
[address] ENCOUNTER          ATTENDING PHYSICIAN NOTE       Date of evaluation: 6/28/2021    ADDENDUM:      Care of this patient was assumed from Dr. Franki Mena. The patient was seen for Fall (at independent living, got up to make drink rum and coke and fell), Head Injury, and Shoulder Injury (left shoulder injury )  . The patient's initial evaluation and plan have been discussed with the prior provider who initially evaluated the patient. Nursing Notes, Past Medical Hx, Past Surgical Hx, Social Hx, Allergies, and Family Hx were all reviewed. Diagnostic Results     RADIOLOGY:  CT SHOULDER LEFT WO CONTRAST   Final Result      Comminuted fracture involving the junction of the humeral head and neck as described with greater than 10 mm of impaction and displacement of the fractured fragments as described. There is less than 45 degrees angulation of the fractured fragments. XR CHEST PORTABLE   Final Result   Impression: No acute cardiopulmonary abnormality. XR KNEE LEFT (1-2 VIEWS)   Final Result   Impression: No acute osseous abnormality. Tricompartmental degenerative changes. XR SHOULDER LEFT (MIN 2 VIEWS)   Final Result   Impression: Comminuted humeral neck fracture on the left without significant displacement or dislocation. XR HUMERUS LEFT (MIN 2 VIEWS)   Final Result   Impression: Comminuted left humeral neck fracture. CT HEAD WO CONTRAST   Final Result      No acute intracranial abnormality. Right frontal cephalohematoma.           LABS:   Results for orders placed or performed during the hospital encounter of 06/28/21   CBC Auto Differential   Result Value Ref Range    WBC 8.8 4.0 - 11.0 K/uL    RBC 4.14 4.00 - 5.20 M/uL    Hemoglobin 12.3 12.0 - 16.0 g/dL    Hematocrit 37.7 36.0 - 48.0 %    MCV 91.1 80.0 - 100.0 fL    MCH 29.6 26.0 - 34.0 pg    MCHC 32.5 31.0 - 36.0 g/dL    RDW 15.5 (H) 12.4 - 15.4 %    Platelets 162 188 - 303 K/uL    MPV 8.5 5.0 - 10.5 fL    Neutrophils % 85.1 %    Lymphocytes % 9.6 %    Monocytes % 3.7 %    Eosinophils % 1.1 %    Basophils % 0.5 %    Neutrophils Absolute 7.5 1.7 - 7.7 K/uL    Lymphocytes Absolute 0.8 (L) 1.0 - 5.1 K/uL    Monocytes Absolute 0.3 0.0 - 1.3 K/uL    Eosinophils Absolute 0.1 0.0 - 0.6 K/uL    Basophils Absolute 0.0 0.0 - 0.2 K/uL   Basic Metabolic Panel   Result Value Ref Range    Sodium 141 136 - 145 mmol/L    Potassium 4.2 3.5 - 5.1 mmol/L    Chloride 103 99 - 110 mmol/L    CO2 21 21 - 32 mmol/L    Anion Gap 17 (H) 3 - 16    Glucose 110 (H) 70 - 99 mg/dL    BUN 8 7 - 20 mg/dL    CREATININE <0.5 (L) 0.6 - 1.2 mg/dL    GFR Non-African American >60 >60    GFR African American >60 >60    Calcium 9.0 8.3 - 10.6 mg/dL   EKG 12 Lead   Result Value Ref Range    Ventricular Rate 80 BPM    Atrial Rate 80 BPM    P-R Interval 192 ms    QRS Duration 74 ms    Q-T Interval 392 ms    QTc Calculation (Bazett) 452 ms    P Axis 82 degrees    R Axis 1 degrees    T Axis 29 degrees    Diagnosis       EKG performed in ER and to be interpreted by ER physician. Confirmed by MD, ER ([id number]),  Bennycarole Oseguera, [address] [place] [address] ([facility]) on 6/28/2021 8:09:00 AM   TYPE AND SCREEN   Result Value Ref Range    ABO/Rh B POS     Antibody Screen NEG        RECENT VITALS:  BP: (!) 109/54, Temp: 98 °F (36.7 °C), Pulse: 87, Resp: 18, SpO2: 90 %       ED Course     The patient was given the following medications:  Orders Placed This Encounter   Medications    fentaNYL (SUBLIMAZE) injection 50 mcg    HYDROmorphone (DILAUDID) injection 0.5 mg    HYDROmorphone (DILAUDID) injection 0.5 mg    HYDROmorphone (DILAUDID) injection 1 mg       CONSULTS:  IP CONSULT TO ORTHOPEDIC SURGERY  IP CONSULT TO HOSPITALIST  IP CONSULT TO [address] [place] [address] / JOSEFINA / Julian Fuentesmatias is a 76 y.o. female presenting from independent living after a fall. The patient sustained a left proximal humerus fracture but all other evaluation is unremarkable. CT the head and labs are all unremarkable. The patient is walker dependent and at this point is unable to use her walker and therefore is unable to get around in her apartment because of the humerus fracture and will need to be admitted. Orthopedic surgery was consulted recommended CT to determine operative or nonoperative repair and it was felt that this could be managed as an outpatient with a sling in place and follow-up in 1 week with Dr. Barb Ahumada who saw the patient in the emergency department. PT OT was here at the bedside to evaluate the patient and felt that she would need nursing home placement and the patient is agreeable to this plan and can get placed back at her original facility. Pending acceptance at the nursing home, the plan is to discharge the patient to the SNF to follow-up with Dr. Zaira Ho in a week. Clinical Impression     1. Closed fracture of proximal end of left humerus, unspecified fracture morphology, initial encounter    2.  Contusion of forehead, initial encounter        Disposition     PATIENT REFERRED TO:  MICHAEL Moreno at [address] [address]  [phone]          DISCHARGE MEDICATIONS:  New Prescriptions    No medications on file       DISPOSITION Decision To Admit 06/28/2021 07:19:26 AM       Adrienne Velazquez MD  06/28/21 0328       Adrienne Velazquez MD  06/28/21 2401

## 2021-06-28 NOTE — CARE COORDINATION
Case Management Assessment            Discharge Note                    Date / Time of Note: 6/28/2021 3:11 PM                  Discharge Note Completed by: Eleuterio Mccarty RN    Patient Name: Miguelina Castelan   YOB: 1945  Diagnosis: No admission diagnoses are documented for this encounter. Date / Time: 6/28/2021  4:29 AM    Current PCP: Geraldo Dhillon MD  Clinic patient: No    Hospitalization in the last 30 days: No    Advance Directives:  Code Status: Prior  [place] DNR form completed and on chart: No    Financial:  Payor: Shun Sky / Plan: MEDICARE PART A AND B / Product Type: *No Product type* /      Pharmacy:    [facility], [address]  [address] [place] [address] [address] [facility]  Phone: [phone] Fax: [phone]      Assistance purchasing medications?:    Assistance provided by Case Management: None at this time    Does patient want to participate in local refill/ meds to beds program?:      Meds To Beds General Rules:  1. Can ONLY be done Monday- Friday between 8:30am-5pm  2. Prescription(s) must be in pharmacy by 3pm to be filled same day  3. Copy of patient's insurance/ prescription drug card and patient face sheet must be sent along with the prescription(s)  4. Cost of Rx cannot be added to hospital bill. If financial assistance is needed, please contact unit  or ;  or  CANNOT provide pharmacy voucher for patients co-pays  5.  Patients can then  the prescription on their way out of the hospital at discharge, or pharmacy can deliver to the bedside if staff is available. (payment due at time of pick-up or delivery - cash, check, or card accepted)     Able to afford home medications/ co-pay costs: Yes    ADLS:  Current PT AM-PAC Score: 10 /24  Current OT AM-PAC Score: 14 /24      DISCHARGE Disposition: [facility] (SNF): [address] Phone: [phone] Fax: [phone]    LOC at discharge: Skilled  MORENITA Completed: Yes    Notification completed in HENS/PAS?:  Not Applicable    IMM Completed:   Not Indicated    Transportation:  Transportation PLAN for discharge: EMS transportation   Mode of Transport: Ambulance stretcher - BLS  Reason for medical transport: Bed confined: Meets the following criteria 1) unable to get out of bed without assistance or ambulate, 2) unable to safely sit up in a wheelchair, 3) unable to maintain erect seating position in a chair for time needed for transport  Name of Transport Company: [employer]  Phone: [phone]  Time of Transport: 4pm    Transport form completed: Yes        Additional CM Notes: Pt from [address] will DC to [address] [facility] for rehab, Marnie Talamantes aware will take pt today. Transport at [facility] via [facility] care [phone]. Nurse to call report to [phone], orders faxed to [phone]    The Plan for Transition of Care is related to the following treatment goals of No admission diagnoses are documented for this encounter. The Patient and/or patient representative [facility] and her family were provided with a choice of provider and agrees with the discharge plan Yes    Freedom of choice list was provided with basic dialogue that supports the patient's individualized plan of care/goals and shares the quality data associated with the providers.  Yes    Care Transitions patient: No    Frederic Snow RN  [facility]  Case Management Department  Ph: [phone]  Fax: [phone]

## 2021-06-28 NOTE — ED PROVIDER NOTES
[address] [facility] [address] ENCOUNTER          ATTENDING PHYSICIAN NOTE       Date of evaluation: 6/28/2021    Chief Complaint     Fall (at independent living, got up to make drink rum and coke and fell), Head Injury, and Shoulder Injury (left shoulder injury )      History of Present Illness     Bola Velazquez is a 76 y.o. female who presents to the emergency department after a fall. The patient was navigating with her walker when she lost her balance and fell forward striking her head and left shoulder. The patient has a notable history for having had Covid in February of this year which led to deconditioning and a subsequent fall which was complicated by a hip fracture. Patient had been in rehab but only recently discharged to an assisted living setting. Patient reports that she usually gets around with the assistance of a 4 wheeled scooter but also does transition from that to the bathroom to perform her ADLs. The patient reports no recent illnesses states she otherwise feels well now with the exception of severe pain primarily in her left shoulder. She did strike her head but not has not had any nausea and vomiting. Denies any abdominal pain or chest pain. Review of Systems     As documented in the HPI, otherwise all other systems were reviewed and were negative. Past Medical, Surgical, Family, and Social History     She has a past medical history of Diabetes mellitus ([address] Hyperlipidemia, Pulmonary fibrosis ([address] and Thyroid disease. She has a past surgical history that includes Upper gastrointestinal endoscopy and Colonoscopy. Her family history is not on file. She reports that she has never smoked. She has never used smokeless tobacco. She reports current alcohol use. She reports that she does not use drugs. Medications     Previous Medications    ALPRAZOLAM (XANAX) 0.5 MG TABLET    Take 0.5 mg by mouth nightly as needed for Sleep.     ASPIRIN 81 MG TABLET    Take 81 mg by mouth daily    CITALOPRAM (CELEXA) 20 MG TABLET    Take 20 mg by mouth daily    DICYCLOMINE (BENTYL) 20 MG TABLET    Take 20 mg by mouth every 4-6 hours as needed    ERGOCALCIFEROL (VITAMIN D2 PO)    Take 50,000 Units by mouth once a week    FERROUS SULFATE 325 (65 FE) MG TABLET    Take 1 tablet by mouth 2 times daily    FUROSEMIDE (LASIX) 40 MG TABLET    Take 1 tablet by mouth daily    LEVOTHYROXINE (SYNTHROID) 137 MCG TABLET    Take 137 mcg by mouth Daily    METFORMIN (GLUCOPHAGE) 1000 MG TABLET    Take 1,000 mg by mouth 2 times daily (with meals)    PANTOPRAZOLE (PROTONIX) 40 MG TABLET    Take 40 mg by mouth daily    SIMVASTATIN (ZOCOR) 20 MG TABLET    Take 20 mg by mouth nightly       Allergies     She is allergic to lisinopril and pcn [penicillins].     Physical Exam     INITIAL VITALS: BP: (!) 114/58, Temp: 98 °F (36.7 °C), Pulse: 87, Resp: 18, SpO2: 90 %   General: Elderly female sitting in bed with no significant cardiorespiratory distress  HEENT: Large contusion over the right forehead, pupils equal round and reactive to light, sclera are clear, oropharynx is nonerythematous, midface is stable, no trismus  Neck: supple, no lymphadenopathy  Chest: nonlabored respirations, equal chest rise bilaterally, no accessory muscle use  Cardiovascular: Regular, rate, and rhythm, 2+ radial pulses bilaterally, capillary refill 2 seconds  Abdominal: Soft, nontender, nondistended, positive bowel sounds throughout, no rebound or guarding  Skin: Warm, dry well perfused, no rashes  Musculoskeletal: Deformity and tenderness to palpation in the left shoulder with even light palpation no overlying ecchymoses pain with range of motion of the left shoulder otherwise no distinct bony tenderness on palpation of the axial skeleton patient does have a noted abrasion over the left knee  Neurologic:  alert and oriented x4, speech is clear and intact without dysarthria    Diagnostic Results     EKG: Normal sinus rhythm no ST or T wave changes that be indicative of active ischemia normal axis normal intervals    RADIOLOGY:  CT HEAD WO CONTRAST    (Results Pending)   XR HUMERUS LEFT (MIN 2 VIEWS)    (Results Pending)   XR SHOULDER LEFT (MIN 2 VIEWS)    (Results Pending)   XR KNEE LEFT (1-2 VIEWS)    (Results Pending)   XR CHEST PORTABLE    (Results Pending)       LABS:   Results for orders placed or performed during the hospital encounter of 06/28/21   CBC Auto Differential   Result Value Ref Range    WBC 8.8 4.0 - 11.0 K/uL    RBC 4.14 4.00 - 5.20 M/uL    Hemoglobin 12.3 12.0 - 16.0 g/dL    Hematocrit 37.7 36.0 - 48.0 %    MCV 91.1 80.0 - 100.0 fL    MCH 29.6 26.0 - 34.0 pg    MCHC 32.5 31.0 - 36.0 g/dL    RDW 15.5 (H) 12.4 - 15.4 %    Platelets 852 757 - 763 K/uL    MPV 8.5 5.0 - 10.5 fL    Neutrophils % 85.1 %    Lymphocytes % 9.6 %    Monocytes % 3.7 %    Eosinophils % 1.1 %    Basophils % 0.5 %    Neutrophils Absolute 7.5 1.7 - 7.7 K/uL    Lymphocytes Absolute 0.8 (L) 1.0 - 5.1 K/uL    Monocytes Absolute 0.3 0.0 - 1.3 K/uL    Eosinophils Absolute 0.1 0.0 - 0.6 K/uL    Basophils Absolute 0.0 0.0 - 0.2 K/uL   Basic Metabolic Panel   Result Value Ref Range    Sodium 141 136 - 145 mmol/L    Potassium 4.2 3.5 - 5.1 mmol/L    Chloride 103 99 - 110 mmol/L    CO2 21 21 - 32 mmol/L    Anion Gap 17 (H) 3 - 16    Glucose 110 (H) 70 - 99 mg/dL    BUN 8 7 - 20 mg/dL    CREATININE <0.5 (L) 0.6 - 1.2 mg/dL    GFR Non-African American >60 >60    GFR African American >60 >60    Calcium 9.0 8.3 - 10.6 mg/dL         RECENT VITALS:  BP: (!) 118/56, Temp: 98 °F (36.7 °C), Pulse: 87, Resp: 18, SpO2: 95 %     ED Course     Nursing Notes, Past Medical Hx, Past Surgical Hx, Social Hx, Allergies, and Family Hx were reviewed.     The patient was given the following medications:  Orders Placed This Encounter   Medications    fentaNYL (SUBLIMAZE) injection 50 mcg    HYDROmorphone (DILAUDID) injection 0.5 mg       CONSULTS:  IP CONSULT TO [address] DECISION [facility]KING / ASSESSMENT / Lázaro Jenelle is a 76 y.o. female who presents emergency department the complaint of left shoulder pain after a apparent mechanical fall. On physical examination patient had significant contusion on the right forehead and given the patient's age a CT scan of the head was performed. X-rays of the left shoulder left humerus and left knee were done for apparent evidence of trauma on physical exam.  These radiographic studies have resulted back showing evidence of a comminuted left proximal humerus fracture left knee does not show any fracture per my interpretation. There is no evidence of any dislocation of the left humeral fracture. The patient CT scan of the head showed no intracranial hemorrhage again per my interpretation. The patient has consult orthopedic surgery currently pending and will likely require admission the hospital for further care and evaluation to include PT and OT evaluation and consideration for surgery given the location and nature of this fracture. Clinical Impression     1. Closed fracture of proximal end of left humerus, unspecified fracture morphology, initial encounter    2. Contusion of forehead, initial encounter        Disposition     PATIENT REFERRED TO:  No follow-up provider specified.     DISCHARGE MEDICATIONS:  New Prescriptions    No medications on file       DISPOSITION           Darrel Montenegro MD  06/28/21 6125

## 2021-06-28 NOTE — ED NOTES
Report called to RN at [facility] at [place].  [address] [facility], [address] [place] [address]  06/28/21 1612

## 2021-06-28 NOTE — ED NOTES
[facility] at bedside to take pt to [address] at [facility]. All belongings with pt. Discharge instructions with transport company.         Arnol Hernandez RN  06/28/21 6532

## 2021-06-28 NOTE — PROGRESS NOTES
Physical Therapy    Facility/Department: [address] [place] [address]  Initial Assessment and Treatment    NAME: Lona Dominguez  : 1945  MRN: [id number]    Date of Service: 2021    Discharge Recommendations:    Lona Dominguez scored a 10/24 on the AM-PAC short mobility form. Current research shows that an AM-PAC score of 17 or less is typically not associated with a discharge to the patient's home setting. Based on the patient's AM-PAC score and their current functional mobility deficits, it is recommended that the patient have 3-5 sessions per week of Physical Therapy at d/c to increase the patient's independence. Please see assessment section for further patient specific details. If patient discharges prior to next session this note will serve as a discharge summary. Please see below for the latest assessment towards goals. PT Equipment Recommendations  Equipment Needed:  (defer)    Assessment   Body structures, Functions, Activity limitations: Decreased functional mobility ; Decreased balance; Increased pain  Assessment: Pt with decreased independent mobility from baseline s/p fall and L humerus fracture. Pt reports normally independent with ambulation with 4 wheeled walker. Currently needing assist x 2 for bed mobility, transfers and gt a few steps. At risk for falls and not safe to get up alone. Safety concerns for pt returning home alone. Rec cont skilled PT to maximize mobility and independence  Treatment Diagnosis: impaired functional mobility due to L humerus fracture s/p fall  Decision Making: Medium Complexity  PT Education: Goals;PT Role;Plan of Care;General Safety; Functional Mobility Training;Precautions  Patient Education: Pt verbalized understanding  REQUIRES PT FOLLOW UP: Yes       Patient Diagnosis(es): The primary encounter diagnosis was Closed fracture of proximal end of left humerus, unspecified fracture morphology, initial encounter.  A diagnosis of Contusion of forehead, initial encounter was also pertinent to this visit. has a past medical history of Diabetes mellitus ([address] Hyperlipidemia, Pulmonary fibrosis ([address] and Thyroid disease. has a past surgical history that includes Upper gastrointestinal endoscopy and Colonoscopy. Restrictions  Position Activity Restriction  Other position/activity restrictions: NWB LUE per ortho note, sling  Vision/Hearing        Subjective  General  Chart Reviewed: Yes  Additional Pertinent Hx: Pt is a 76 y.o. female who presented to the emergency department on 6/28 after a fall. The patient was navigating with her walker when she lost her balance and fell forward striking her head and left shoulder. L shoulder Xray:Comminuted humeral neck fracture on the left without significant displacement or dislocation. CT L shoulder:Comminuted fracture involving the junction of the humeral head and neck as described with greater than 10 mm of impaction and displacement of the fractured fragments as described. Head CT: neg. CXR: neg. L knee Xray: neg. PMH: DM, hyperlipidemia,pulmonary fibrosis  Referring Practitioner: Lay Corrales MD  Referral Date : 06/28/21  Subjective  Subjective: Pt found supine on stretcher. Agreeable to PT. Pain Screening  Patient Currently in Pain: Yes (L shoulder, not rated, RN aware)  Vital Signs  Patient Currently in Pain: Yes (L shoulder, not rated, RN aware)       Orientation  Orientation  Overall Orientation Status: Within Functional Limits  Social/Functional History  Social/Functional History  Type of Home: Facility ([address] [place] [address])  Home Equipment: 4 wheeled walker, Oxygen (has oxygen concentrator at home but hasn't used it.)  ADL Assistance: Independent (\"I've been a little leary\" of showers)  Homemaking Assistance:  (meals brought to her, does some light cooking)  Ambulation Assistance: Independent (with rollator)  Transfer Assistance: Independent  Additional Comments:  The patient has term goals  Time Frame for Short term goals: By discharge  Short term goal 1: Sup to sit SBA  Short term goal 2: Pt will transfer sit to stand with CGA  Short term goal 3: Pt will amb >30' with LRAD CGA       Therapy Time   Individual Concurrent Group Co-treatment   Time In 1335         Time Out 1413         Minutes 38              Timed Code Treatment Minutes:23       Total Treatment Minutes:  [facility], PT

## 2021-06-28 NOTE — PROGRESS NOTES
Occupational Therapy   Occupational Therapy Initial Assessment/Tx Note  Date: 2021   Patient Name: Lizy Villavicencio  MRN: [id number]     : 1945    Date of Service: 2021     Assessment: Functional independence is decreased from baseline. Pt fairly recently discharged from SNF back to her apt after hip fx. She suffered a fall last night and now has a proximal humerus fx requiring immobilization. As a result, she is unable to use her walker and needs assist with all mobility and ADLs. Pt is unable to safely return to her apt and will benefit from continued inpt OT/PT at d/c. Discharge Recommendations: Lizy Villavicencio scored a 14/24 on the AM-PAC ADL Inpatient form. Current research shows that an AM-PAC score of 17 or less is typically not associated with a discharge to the patient's home setting. Based on the patient's AM-PAC score and their current ADL deficits, it is recommended that the patient have 3-5 sessions per week of Occupational Therapy at d/c to increase the patient's independence. Please see assessment section for further patient specific details. OT Equipment Recommendations  Equipment Needed: No    Assessment   Performance deficits / Impairments: Decreased functional mobility ; Decreased ADL status; Decreased endurance;Decreased balance;Decreased high-level IADLs  Treatment Diagnosis: Decreased activity tolerance, impaired ADLs and mobility  Decision Making: Medium Complexity  REQUIRES OT FOLLOW UP: Yes  Activity Tolerance  Activity Tolerance: Patient Tolerated treatment well  Safety Devices  Safety Devices in place: Yes  Type of devices: Call light within reach; Left in bed;Nurse notified           Treatment Diagnosis: Decreased activity tolerance, impaired ADLs and mobility      Restrictions  Position Activity Restriction  Other position/activity restrictions: NWB LUE per ortho note, sling    Subjective   General  Chart Reviewed:  Yes  Additional Pertinent Hx: 76 y.o. F to ED  with L proximal humerus fx [place] Ortho: sling for comfort. PMHx includes DM, pulmonary fibrosis, Covid 19 2/21, hip fx (non-operative). Family / Caregiver Present: No  Referring Practitioner: Willing  Subjective  Subjective: Pt in bed on arrival. Pleasant and cooperative. \"It's more the anticipation of pain. \" Disappointed in having to return to the [facility] [address] [facility] [address] Pain Assessment  Pain Assessment: 0-10 (yes, L shoulder, unrated, ice packs placed and new sling placed to increase comfort, RN aware)    Social/Functional History  Social/Functional History  Type of Home: Facility ([address] [place] [address])  Home Equipment: 4 wheeled walker, Oxygen (has oxygen concentrator at home but hasn't used it.)  ADL Assistance: Independent (\"I've been a little leary\" of showers)  Homemaking Assistance:  (meals brought to her, does some light cooking)  Ambulation Assistance: Independent (with rollator)  Transfer Assistance: Independent  Additional Comments: The patient has a notable history for having had Covid in February of this year which led to deconditioning and a subsequent fall which was complicated by a hip fracture. Went to [facility]. Had been home for about 3 weeks prior to this admission. Was getting home PT/OT. Objective   Vision: Within Functional Limits  Hearing: Within functional limits    Orientation  Overall Orientation Status: Within Functional Limits     Balance  Sitting Balance: Minimal assistance (mod assist initially, improving to CGA)  Standing Balance: Minimal assistance (x2)  Standing Balance  Time: 1 min  Activity: static stance, side steps at bedside  Comment: Mod assist x2 sit to stand, min assist x2 static stance and side steps. Slow, guarded.   ADL  Feeding: Supervision;Setup  Grooming: Minimal assistance;Setup  Toileting: Dependent/Total (using purwick ext catheter; anticipate at least mod assist for toileting)  Additional Comments: Dependent to doff/don shoulder immobilizer. Anticipate mod to max assist for dressing, bathing   Transfers  Sit to stand: 2 Person assistance; Moderate assistance  Stand to sit: 2 Person assistance; Moderate assistance     Cognition  Overall Cognitive Status: WFL  Hand Dominance  Hand Dominance: Right        Plan  If pt discharges prior to next tx, this note will serve as d/c summary. Continue per POC if pt does not d/c.     Plan  Times per week: 2-5x  Times per day: Daily  Current Treatment Recommendations: Balance Training, Functional Mobility Training, Endurance Training, Self-Care / ADL, Safety Education & Training, Patient/Caregiver Education & Training, Equipment Evaluation, Education, & procurement      AM-PAC Score  AM-PAC Inpatient Daily Activity Raw Score: 14 (06/28/21 1417)  AM-PAC Inpatient ADL T-Scale Score : 33.39 (06/28/21 1417)  ADL Inpatient CMS 0-100% Score: 59.67 (06/28/21 1417)  ADL Inpatient CMS G-Code Modifier : CK (06/28/21 1417)    Goals  Short term goals  Time Frame for Short term goals: by D/C  Short term goal 1:  Increase standing/mobility tolerance to 5 min - not met  Short term goal 2: Static stance SBA - not met  Short term goal 3: Tolerate transfer to chair/BSC - Not met  Short term goal 4: Participate in toileting at toilet or BSC - Not met       Therapy Time   Individual Concurrent Group Co-treatment   Time In 1335         Time Out 1415         Minutes 40          Timed Code Tx Min: 25  Total Tx Time: 40       Fanta Adam, OT

## 2021-06-28 NOTE — CONSULTS
Department of Orthopedic Surgery  Attending : Misty Berkowitz MD  Consult Note        Reason for Consult:  fall, left shoulder pain  Requesting Physician: Chen Mann MD    CHIEF COMPLAINT:  As Above    History Obtained From:  patient    HISTORY OF PRESENT ILLNESS:                The patient is a 76 y.o. female who presents with above chief complaint. The patient presents with a main complaint of left shoulder pain and injury  She presented to the emergency department early this morning after a fall at independent living where she had been residing. She just recently was transferred from skilled nursing facility after being treated for a hip fracture  The patient states that she lost her balance and fell forward striking both her head and her left shoulder at the time of the injury  Images in the emergency department demonstrated a comminuted left proximal humerus humeral neck fracture  She has been in a sling and swath since the injury. She reports no antecedent left shoulder pain  No prior history of surgery to left shoulder  She denies any known loss of consciousness  The patient states that she mobilizes throughout the household with four-wheel scooter  She is able to perform most activities of daily living  She was also complaining initially of some left knee pain and images were obtained that did not demonstrate any obvious acute fracture    Past Medical History:        Diagnosis Date    Diabetes mellitus ([facility]     Hyperlipidemia     Pulmonary fibrosis ([facility]     Thyroid disease      Past Surgical History:        Procedure Laterality Date    COLONOSCOPY      UPPER GASTROINTESTINAL ENDOSCOPY           Medications Prior to Admission:   Prior to Admission medications    Medication Sig Start Date End Date Taking?  Authorizing Provider   furosemide (LASIX) 40 MG tablet Take 1 tablet by mouth daily 6/19/19   Laine Murphy MD   ferrous sulfate 325 (65 Fe) MG tablet Take 1 tablet by mouth 2 times daily 6/19/19 Chad Zuleta MD   ALPRAZolam Verlene Bang) 0.5 MG tablet Take 0.5 mg by mouth nightly as needed for Sleep. Historical Provider, MD   aspirin 81 MG tablet Take 81 mg by mouth daily    Historical Provider, MD   dicyclomine (BENTYL) 20 MG tablet Take 20 mg by mouth every 4-6 hours as needed    Historical Provider, MD   citalopram (CELEXA) 20 MG tablet Take 20 mg by mouth daily    Historical Provider, MD   levothyroxine (SYNTHROID) 137 MCG tablet Take 137 mcg by mouth Daily    Historical Provider, MD   metFORMIN (GLUCOPHAGE) 1000 MG tablet Take 1,000 mg by mouth 2 times daily (with meals)    Historical Provider, MD   pantoprazole (PROTONIX) 40 MG tablet Take 40 mg by mouth daily    Historical Provider, MD   simvastatin (ZOCOR) 20 MG tablet Take 20 mg by mouth nightly    Historical Provider, MD   Ergocalciferol (VITAMIN D2 PO) Take 50,000 Units by mouth once a week    Historical Provider, MD       Allergies:  Lisinopril and Pcn [penicillins]    Social History:    Tobacco:  reports that she has never smoked. She has never used smokeless tobacco.   Alcohol:  reports current alcohol use. Illicit Drug: No  Family History:   No family history on file.     REVIEW OF SYSTEMS:    CONSTITUTIONAL:  negative for  fevers, chills and sweats  RESPIRATORY:  negative for  dry cough, cough with sputum and dyspnea  CARDIOVASCULAR:  negative for  chest pain, dyspnea, palpitations  GASTROINTESTINAL:  negative for nausea, vomiting and diarrhea  MUSCULOSKELETAL:  positive for  pain and decreased range of motion left shoulder    PHYSICAL EXAM:    awake, alert, cooperative, no apparent distress, and appears stated age  MUSCULOSKELETAL:  there is no redness, warmth, or swelling of the joints  full range of motion noted  motor strength is 5 out of 5 all extremities bilaterally  tone is normal  with exception of  Left shoulder/left upper extremity:  There is some diffuse swelling throughout the left shoulder, no evidence of abrasions or open skin wounds  No swelling throughout the remainder of the left upper extremity  The shoulder is held in a sling and swath currently  With gentle motion the elbow has no tenderness or pain with range of motion  There is active wrist flexion extension, active finger flexion extension abduction  Active AIN/PIN/interossei distally  Shoulder is diffusely tender to palpation with limited examination  No formal shoulder range of motion or strength testing today secondary to known injury  Gross sensation intact median ulnar radial as well as axillary nerve distribution  Brisk capillary refill all fingers with 2+ palpable radial pulse  Compartments throughout the forearm and arm were soft and compressible    DATA:    CBC:   Recent Labs     06/28/21  0458   WBC 8.8   HGB 12.3        BMP:    Recent Labs     06/28/21  0458      K 4.2      CO2 21   BUN 8   CREATININE <0.5*   GLUCOSE 110*     INR: No results for input(s): INR in the last 72 hours. Radiology:   XR SHOULDER LEFT (MIN 2 VIEWS)       Indication: fall, pain left shoulder pain       COMPARISON: None       Findings: 3 views of the left shoulder were obtained. There is an acute, comminuted left humeral neck fracture with no significant displacement. No dislocation of the left glenohumeral joint. Regional soft tissues are unremarkable.           Impression   Impression: Comminuted humeral neck fracture on the left without significant displacement or dislocation. XR KNEE LEFT (1-2 VIEWS)       Indication: fall, pain left knee pain       COMPARISON: None       Findings: 2 views of the left knee were obtained. The bones are reasonably well mineralized. There is no acute fracture or dislocation. Tricompartmental degenerative changes are noted. There is marked spurring of the patellofemoral articulation. There    may be a small suprapatellar joint effusion.  The regional soft tissues are otherwise unremarkable.           Impression   Impression: No acute osseous abnormality.       Tricompartmental degenerative changes. XR HUMERUS LEFT (MIN 2 VIEWS)       Indication: pain, fall left arm pain       COMPARISON: None       Findings: Frontal and lateral views of the left humerus were obtained. Comminuted left humeral neck fracture is noted. No dislocation of the left glenohumeral joint. No additional fracture deformity. The regional soft tissues are unremarkable.           Impression   Impression: Comminuted left humeral neck fracture. Images reviewed of the left shoulder, left humerus and left knee with proximal humerus fracture left shoulder, agree with above findings    CT scan was also reviewed demonstrating a comminuted proximal humerus fracture with no obvious intra-articular extension    IMPRESSION/RECOMMENDATIONS:    Assessment: 79-year-old female presenting with history of recent fall, left shoulder pain  1.   Left shoulder proximal humerus fracture without intra-articular extension with mild displacement and  comminution    Plan:  1) treatment plan was discussed with patient today  She does have pain in the shoulder but overall has been managed here in the emergency department with adequate pain control    No signs of neurovascular injury    Continue with sling and swath for comfort    Operative and nonoperative treatment options were briefly discussed with patient today, she may be a candidate to consider conservative management based on current alignment of fracture and no obvious intra-articular extension    Plan to manage as outpatient, okay for discharge from orthopedic standpoint if safe after PT/OT evaluation and per hospitalist and emergency medicine    Plan for follow-up within 1 week in orthopedic clinic with [facility] with plans for repeat clinical evaluation and repeat imaging of the left shoulder    Note weightbearing through the left upper extremity until follow-up      Thank you for the opportunity to consult on this patient.     MD Adela Luu MD

## 2021-06-28 NOTE — CARE COORDINATION
Cm following, spoke with pt she states she is from [place] and was recently at [facility] for rehab, would like to go back to [facility]. Referral made and LM for [facility] at [phone] to see if they could take pt back for rehab. PT/OT evals pending, no precert needed. [place] need transport at [facility]. Electronically signed by Sarah Cruz RN on 6/28/2021 at 11:08 AM  [phone]    UPDATE:  Spoke with Veronica Pappas at [facility] they can take pt once PT OT evals are completed.   Electronically signed by Sarah Cruz RN on 6/28/2021 at 11:12 AM  [phone]

## 2021-06-28 NOTE — DISCHARGE INSTR - COC
Continuity of Care Form    Patient Name: Jamal Verma   :  1945  MRN:  [id number]    Admit date:  2021  Discharge date:  ***    Code Status Order: Prior   Advance Directives:     Admitting Physician:  No admitting provider for patient encounter. PCP: Federico Woodall MD    Discharging Nurse: [facility] Unit/Room#: A04/A04-04  Discharging Unit Phone Number: ***    Emergency Contact:   Extended Emergency Contact Information  Primary Emergency Contact: Tres Sanderson  Home Phone: [phone]  Relation: Child  Secondary Emergency Contact: Cammy Levi  Home Phone: [phone]  Relation: Child   needed?  No    Past Surgical History:  Past Surgical History:   Procedure Laterality Date    COLONOSCOPY      UPPER GASTROINTESTINAL ENDOSCOPY         Immunization History:   Immunization History   Administered Date(s) Administered    Influenza Vaccine, unspecified formulation 10/03/2014    Influenza Virus Vaccine 2015    Pneumococcal Conjugate 13-valent (Chcprny71) 2018    Pneumococcal Polysaccharide ([id number]) 2015       Active Problems:  Patient Active Problem List   Diagnosis Code    Anemia D64.9       Isolation/Infection:   Isolation          No Isolation        Patient Infection Status     None to display          Nurse Assessment:  Last Vital Signs: BP (!) 103/46   Pulse 87   Temp 98 °F (36.7 °C) (Oral)   Resp 18   Ht 5' 8\" (1.727 m)   Wt 167 lb (75.8 kg)   SpO2 90%   Breastfeeding No   BMI 25.39 kg/m²     Last documented pain score (0-10 scale): Pain Level: 8  Last Weight:   Wt Readings from Last 1 Encounters:   21 167 lb (75.8 kg)     Mental Status:  {IP PT MENTAL STATUS:}    IV Access:  { [facility] IV ACCESS:784944850}    Nursing Mobility/ADLs:  Walking   {CHP DME FFBO:847635274}  Transfer  {P DME TGBF:223166119}  Bathing  {CHP DME YPYU:568704506}  Dressing  {CHP DME LEHA:028787299}  Toileting  {P DME JLUW:569222046}  Feeding  {P DME PQCK:545291742}  Med Admin  {[facility] DME KQSM:256662846}  Med Delivery   { MOR[facility]ITA MED Delivery:633451008}    Wound Care Documentation and Therapy:        Elimination:  Continence:   · Bowel: {YES / HW:90579}  · Bladder: {YES / IX:53002}  Urinary Catheter: {Urinary Catheter:336807726}   Colostomy/Ileostomy/Ileal Conduit: {YES / RN:86760}       Date of Last BM: ***  No intake or output data in the 24 hours ending 21 1521  No intake/output data recorded.     Safety Concerns:     [employer] Safety Concerns:219054293}    Impairments/Disabilities:      [employer] Impairments/Disabilities:160208771}    Nutrition Therapy:  Current Nutrition Therapy:   [employer] Diet List:679004189}    Routes of Feeding: {[facility] DME Other Feedings:511755973}  Liquids: {Slp liquid thickness:43263}  Daily Fluid Restriction: {[facility] DME Yes amt example:078305168}  Last Modified Barium Swallow with Video (Video Swallowing Test): {Done Not Done VAAI:833645792}    Treatments at the Time of Hospital Discharge:   Respiratory Treatments: ***  Oxygen Therapy:  {Therapy; copd oxygen:44667}  Ventilator:    {[facility] Vent CVAO:450477342}    Rehab Therapies: {THERAPEUTIC INTERVENTION:9037042323}  Weight Bearing Status/Restrictions: [address] [employer] Weight Bearin}  Other Medical Equipment (for information only, NOT a DME order):  {EQUIPMENT:132011122}  Other Treatments: ***    Patient's personal belongings (please select all that are sent with patient):  {[facility] DME Belongings:046097885}    RN SIGNATURE:  {Esignature:721657412}    CASE MANAGEMENT/SOCIAL WORK SECTION    Inpatient Status Date: ***    Readmission Risk Assessment Score:  Readmission Risk              Risk of Unplanned Readmission:  0           Discharging to Facility/ Agency     [address], [address] [place] [id number]       Phone: [phone]       Fax: [phone]        ·     / signature: Electronically signed by Waqas Terry RN on 6/28/21 at 3:22 PM EDT    PHYSICIAN SECTION    Prognosis: Good    Condition at Discharge: Stable    Rehab Potential (if transferring to Rehab): Fair    Recommended Labs or Other Treatments After Discharge: PT OT evals and treat. Non wt bearing to left arm, follow up with ortho Dr Libar Brown in 2 weeks    Physician Certification: I certify the above information and transfer of Jamal Verma  is necessary for the continuing treatment of the diagnosis listed and that she requires [place] for less 30 days.      Update Admission H&P: No change in H&P    PHYSICIAN SIGNATURE:  Les Mane MD

## 2021-09-20 ENCOUNTER — APPOINTMENT (OUTPATIENT)
Dept: CT IMAGING | Age: 76
End: 2021-09-20
Payer: MEDICARE

## 2021-09-20 ENCOUNTER — APPOINTMENT (OUTPATIENT)
Dept: GENERAL RADIOLOGY | Age: 76
End: 2021-09-20
Payer: MEDICARE

## 2021-09-20 ENCOUNTER — HOSPITAL ENCOUNTER (EMERGENCY)
Age: 76
Discharge: HOME | End: 2021-09-20
Attending: EMERGENCY MEDICINE
Payer: MEDICARE

## 2021-09-20 VITALS
TEMPERATURE: 97.6 F | SYSTOLIC BLOOD PRESSURE: 123 MMHG | HEART RATE: 70 BPM | OXYGEN SATURATION: 96 % | DIASTOLIC BLOOD PRESSURE: 56 MMHG | RESPIRATION RATE: 16 BRPM

## 2021-09-20 DIAGNOSIS — S40.022A CONTUSION OF MULTIPLE SITES OF LEFT SHOULDER AND UPPER ARM, INITIAL ENCOUNTER: Primary | ICD-10-CM

## 2021-09-20 DIAGNOSIS — N30.00 ACUTE CYSTITIS WITHOUT HEMATURIA: ICD-10-CM

## 2021-09-20 DIAGNOSIS — S40.012A CONTUSION OF MULTIPLE SITES OF LEFT SHOULDER AND UPPER ARM, INITIAL ENCOUNTER: Primary | ICD-10-CM

## 2021-09-20 LAB
ANION GAP SERPL CALCULATED.3IONS-SCNC: 17 MMOL/L (ref 3–16)
BACTERIA: ABNORMAL /HPF
BASOPHILS ABSOLUTE: 0.1 K/UL (ref 0–0.2)
BASOPHILS RELATIVE PERCENT: 1.2 %
BILIRUBIN URINE: NEGATIVE
BLOOD, URINE: ABNORMAL
BUN BLDV-MCNC: 18 MG/DL (ref 7–20)
CALCIUM SERPL-MCNC: 9.9 MG/DL (ref 8.3–10.6)
CHLORIDE BLD-SCNC: 102 MMOL/L (ref 99–110)
CLARITY: CLEAR
CO2: 16 MMOL/L (ref 21–32)
COLOR: YELLOW
CREAT SERPL-MCNC: 0.6 MG/DL (ref 0.6–1.2)
EKG ATRIAL RATE: 82 BPM
EKG DIAGNOSIS: NORMAL
EKG P AXIS: 74 DEGREES
EKG P-R INTERVAL: 214 MS
EKG Q-T INTERVAL: 368 MS
EKG QRS DURATION: 74 MS
EKG QTC CALCULATION (BAZETT): 429 MS
EKG R AXIS: 17 DEGREES
EKG T AXIS: 44 DEGREES
EKG VENTRICULAR RATE: 82 BPM
EOSINOPHILS ABSOLUTE: 0.1 K/UL (ref 0–0.6)
EOSINOPHILS RELATIVE PERCENT: 2.7 %
EPITHELIAL CELLS, UA: ABNORMAL /HPF (ref 0–5)
GFR AFRICAN AMERICAN: >60
GFR NON-AFRICAN AMERICAN: >60
GLUCOSE BLD-MCNC: 99 MG/DL (ref 70–99)
GLUCOSE URINE: NEGATIVE MG/DL
HCT VFR BLD CALC: 41.4 % (ref 36–48)
HEMOGLOBIN: 13.3 G/DL (ref 12–16)
KETONES, URINE: NEGATIVE MG/DL
LEUKOCYTE ESTERASE, URINE: ABNORMAL
LYMPHOCYTES ABSOLUTE: 1 K/UL (ref 1–5.1)
LYMPHOCYTES RELATIVE PERCENT: 22.3 %
MCH RBC QN AUTO: 31 PG (ref 26–34)
MCHC RBC AUTO-ENTMCNC: 32.1 G/DL (ref 31–36)
MCV RBC AUTO: 96.7 FL (ref 80–100)
MICROSCOPIC EXAMINATION: YES
MONOCYTES ABSOLUTE: 0.3 K/UL (ref 0–1.3)
MONOCYTES RELATIVE PERCENT: 6.4 %
MUCUS: ABNORMAL /LPF
NEUTROPHILS ABSOLUTE: 2.9 K/UL (ref 1.7–7.7)
NEUTROPHILS RELATIVE PERCENT: 67.4 %
NITRITE, URINE: POSITIVE
PDW BLD-RTO: 20 % (ref 12.4–15.4)
PH UA: 6 (ref 5–8)
PLATELET # BLD: 279 K/UL (ref 135–450)
PMV BLD AUTO: 7.9 FL (ref 5–10.5)
POTASSIUM SERPL-SCNC: 4.5 MMOL/L (ref 3.5–5.1)
POTASSIUM SERPL-SCNC: 7.1 MMOL/L (ref 3.5–5.1)
PROTEIN UA: ABNORMAL MG/DL
RBC # BLD: 4.29 M/UL (ref 4–5.2)
RBC UA: ABNORMAL /HPF (ref 0–4)
SODIUM BLD-SCNC: 135 MMOL/L (ref 136–145)
SPECIFIC GRAVITY UA: 1.02 (ref 1–1.03)
TROPONIN: <0.01 NG/ML
URINE TYPE: ABNORMAL
UROBILINOGEN, URINE: 0.2 E.U./DL
WBC # BLD: 4.3 K/UL (ref 4–11)
WBC UA: >100 /HPF (ref 0–5)

## 2021-09-20 PROCEDURE — 81001 URINALYSIS AUTO W/SCOPE: CPT

## 2021-09-20 PROCEDURE — 36415 COLL VENOUS BLD VENIPUNCTURE: CPT

## 2021-09-20 PROCEDURE — 6360000004 HC RX CONTRAST MEDICATION: Performed by: EMERGENCY MEDICINE

## 2021-09-20 PROCEDURE — 70450 CT HEAD/BRAIN W/O DYE: CPT

## 2021-09-20 PROCEDURE — 93005 ELECTROCARDIOGRAM TRACING: CPT | Performed by: EMERGENCY MEDICINE

## 2021-09-20 PROCEDURE — 73030 X-RAY EXAM OF SHOULDER: CPT

## 2021-09-20 PROCEDURE — 99284 EMERGENCY DEPT VISIT MOD MDM: CPT

## 2021-09-20 PROCEDURE — 96375 TX/PRO/DX INJ NEW DRUG ADDON: CPT

## 2021-09-20 PROCEDURE — 85025 COMPLETE CBC W/AUTO DIFF WBC: CPT

## 2021-09-20 PROCEDURE — 71260 CT THORAX DX C+: CPT

## 2021-09-20 PROCEDURE — 6360000002 HC RX W HCPCS: Performed by: EMERGENCY MEDICINE

## 2021-09-20 PROCEDURE — 84132 ASSAY OF SERUM POTASSIUM: CPT

## 2021-09-20 PROCEDURE — 71045 X-RAY EXAM CHEST 1 VIEW: CPT

## 2021-09-20 PROCEDURE — 80048 BASIC METABOLIC PNL TOTAL CA: CPT

## 2021-09-20 PROCEDURE — 96374 THER/PROPH/DIAG INJ IV PUSH: CPT

## 2021-09-20 PROCEDURE — 84484 ASSAY OF TROPONIN QUANT: CPT

## 2021-09-20 PROCEDURE — 72125 CT NECK SPINE W/O DYE: CPT

## 2021-09-20 RX ORDER — MORPHINE SULFATE 4 MG/ML
4 INJECTION, SOLUTION INTRAMUSCULAR; INTRAVENOUS ONCE
Status: COMPLETED | OUTPATIENT
Start: 2021-09-20 | End: 2021-09-20

## 2021-09-20 RX ORDER — FENTANYL CITRATE 50 UG/ML
25 INJECTION, SOLUTION INTRAMUSCULAR; INTRAVENOUS ONCE
Status: COMPLETED | OUTPATIENT
Start: 2021-09-20 | End: 2021-09-20

## 2021-09-20 RX ADMIN — FENTANYL CITRATE 25 MCG: 50 INJECTION, SOLUTION INTRAMUSCULAR; INTRAVENOUS at 05:20

## 2021-09-20 RX ADMIN — IOPAMIDOL 80 ML: 755 INJECTION, SOLUTION INTRAVENOUS at 04:56

## 2021-09-20 RX ADMIN — MORPHINE SULFATE 4 MG: 4 INJECTION INTRAVENOUS at 03:40

## 2021-09-20 ASSESSMENT — PAIN DESCRIPTION - PAIN TYPE: TYPE: ACUTE PAIN;CHRONIC PAIN

## 2021-09-20 ASSESSMENT — PAIN DESCRIPTION - ORIENTATION: ORIENTATION: LEFT

## 2021-09-20 ASSESSMENT — ENCOUNTER SYMPTOMS
EYES NEGATIVE: 1
GASTROINTESTINAL NEGATIVE: 1
RESPIRATORY NEGATIVE: 1

## 2021-09-20 ASSESSMENT — PAIN SCALES - GENERAL
PAINLEVEL_OUTOF10: 10
PAINLEVEL_OUTOF10: 8
PAINLEVEL_OUTOF10: 5

## 2021-09-20 ASSESSMENT — PAIN DESCRIPTION - FREQUENCY: FREQUENCY: CONTINUOUS

## 2021-09-20 ASSESSMENT — PAIN DESCRIPTION - LOCATION: LOCATION: ARM

## 2021-09-20 ASSESSMENT — PAIN DESCRIPTION - DESCRIPTORS: DESCRIPTORS: DISCOMFORT

## 2021-09-20 ASSESSMENT — PAIN DESCRIPTION - ONSET: ONSET: ON-GOING

## 2021-09-20 NOTE — ED PROVIDER NOTES
[facility]          ATTENDING PHYSICIAN NOTE       Date of evaluation: 9/20/2021    Chief Complaint     Fall (states was watching feet when went to get and and fell between dresser and bed, c/o left arm/shoulder pain, hx of current 2 fractures a few months ago) and Fatigue (per squad patient unable to stand )      History of Present Illness     Nubia Mcneil is a 68 y.o. female who presents to the emergency department complaining of left shoulder pain after fall. Patient states she has a history of neuropathy and has some difficulty with walking. She states she got up to go to the bathroom and lost her balance to fall. She denies loss of consciousness. She is complaining of headache as well as increased pain to her left shoulder where she was diagnosed with a humerus fracture 3 months ago. She denies any chest pain or abdominal pain. She denies any shortness of breath. She denies any nausea, vomiting, or diarrhea. She denies any pain in her lower extremities beyond her neuropathy pain. She was noted to have oxygen levels in the high 80s to low 90s but states she has a history of pulmonary fibrosis and does use supplemental oxygen as needed. Review of Systems     Review of Systems   Constitutional: Negative. HENT: Negative. Eyes: Negative. Respiratory: Negative. Cardiovascular: Negative. Gastrointestinal: Negative. Genitourinary: Negative. Musculoskeletal: Positive for arthralgias and myalgias. Neurological: Positive for headaches. All other systems reviewed and are negative. Past Medical, Surgical, Family, and Social History     She has a past medical history of Diabetes mellitus ([address] Hyperlipidemia, On home oxygen therapy, Pulmonary fibrosis ([address] and Thyroid disease. She has a past surgical history that includes Upper gastrointestinal endoscopy and Colonoscopy. Her family history is not on file.   She reports that she has never smoked. She has never used smokeless tobacco. She reports current alcohol use. She reports that she does not use drugs. Medications     Current Discharge Medication List      CONTINUE these medications which have NOT CHANGED    Details   furosemide (LASIX) 40 MG tablet Take 1 tablet by mouth daily  Qty: 60 tablet, Refills: 3      ferrous sulfate 325 (65 Fe) MG tablet Take 1 tablet by mouth 2 times daily  Qty: 180 tablet, Refills: 3      ALPRAZolam (XANAX) 0.5 MG tablet Take 0.5 mg by mouth nightly as needed for Sleep. aspirin 81 MG tablet Take 81 mg by mouth daily      dicyclomine (BENTYL) 20 MG tablet Take 20 mg by mouth every 4-6 hours as needed      citalopram (CELEXA) 20 MG tablet Take 20 mg by mouth daily      levothyroxine (SYNTHROID) 137 MCG tablet Take 137 mcg by mouth Daily      metFORMIN (GLUCOPHAGE) 1000 MG tablet Take 1,000 mg by mouth 2 times daily (with meals)      pantoprazole (PROTONIX) 40 MG tablet Take 40 mg by mouth daily      simvastatin (ZOCOR) 20 MG tablet Take 20 mg by mouth nightly      Ergocalciferol (VITAMIN D2 PO) Take 50,000 Units by mouth once a week             Allergies     She is allergic to lisinopril and pcn [penicillins]. Physical Exam     INITIAL VITALS: BP: 125/60, Temp: 97.6 °F (36.4 °C), Pulse: 73, Resp: 20, SpO2: 97 %   Physical Exam  Vitals and nursing note reviewed. Constitutional:       General: She is not in acute distress. HENT:      Head: Normocephalic and atraumatic. Mouth/Throat:      Mouth: Mucous membranes are moist.      Pharynx: No oropharyngeal exudate. Eyes:      General: No scleral icterus. Extraocular Movements: Extraocular movements intact. Conjunctiva/sclera: Conjunctivae normal.      Pupils: Pupils are equal, round, and reactive to light. Cardiovascular:      Rate and Rhythm: Normal rate and regular rhythm. Heart sounds: Normal heart sounds.    Pulmonary:      Effort: Pulmonary effort is normal.      Breath sounds: Normal breath sounds. No wheezing, rhonchi or rales. Chest:      Chest wall: No tenderness. Abdominal:      General: Bowel sounds are normal.      Palpations: Abdomen is soft. Tenderness: There is no abdominal tenderness. There is no guarding or rebound. Musculoskeletal:         General: Tenderness present. No swelling. Cervical back: Normal range of motion and neck supple. Comments: Tender to palpation over the proximal aspect of the left humerus with no obvious deformities noted. Skin:     General: Skin is warm and dry. Neurological:      General: No focal deficit present. Mental Status: She is alert and oriented to person, place, and time. Cranial Nerves: No cranial nerve deficit. Motor: No weakness. Coordination: Coordination normal.         Diagnostic Results     EKG   EKG as interpreted by me shows patient to be in a normal sinus rhythm with normal axis, first-degree AV block with KY interval of 214, normal QT interval, normal QRS duration, no ST segment abnormalities, no T wave abnormalities. RADIOLOGY:  CT CHEST ABDOMEN PELVIS W CONTRAST   Final Result   1. Mild anasarca. 2.  Cholelithiasis. 3.  2 cm hypoenhancing nodule of right adrenal gland   4. No acute fracture or visceral organ injury. _______________________________________________       IMPRESSION:          No pneumothorax, aortic dissection, or acute fracture. CT CERVICAL SPINE WO CONTRAST   Final Result     No acute fracture or subluxation. CT HEAD WO CONTRAST   Final Result     No acute hemorrhage, hydrocephalus, or mass effect. XR SHOULDER LEFT (MIN 2 VIEWS)   Final Result   1. There is a comminuted and impacted fracture of the proximal left    humerus. There is new bone formation in this location indicating either    subacute fracture, incompletely healed, or acute on chronic fracture.    2.  The humeral head is mildly inferiorly positioned with respect to the glenoid suggesting a shoulder joint effusion. XR CHEST PORTABLE   Final Result     Increased density in the lower left hemithorax obscuring the left    diaphragm possibly consolidation or effusion, increased since previous.               LABS:   Results for orders placed or performed during the hospital encounter of 09/20/21   CBC auto differential   Result Value Ref Range    WBC 4.3 4.0 - 11.0 K/uL    RBC 4.29 4.00 - 5.20 M/uL    Hemoglobin 13.3 12.0 - 16.0 g/dL    Hematocrit 41.4 36.0 - 48.0 %    MCV 96.7 80.0 - 100.0 fL    MCH 31.0 26.0 - 34.0 pg    MCHC 32.1 31.0 - 36.0 g/dL    RDW 20.0 (H) 12.4 - 15.4 %    Platelets 074 192 - 590 K/uL    MPV 7.9 5.0 - 10.5 fL    Neutrophils % 67.4 %    Lymphocytes % 22.3 %    Monocytes % 6.4 %    Eosinophils % 2.7 %    Basophils % 1.2 %    Neutrophils Absolute 2.9 1.7 - 7.7 K/uL    Lymphocytes Absolute 1.0 1.0 - 5.1 K/uL    Monocytes Absolute 0.3 0.0 - 1.3 K/uL    Eosinophils Absolute 0.1 0.0 - 0.6 K/uL    Basophils Absolute 0.1 0.0 - 0.2 K/uL   Basic Metabolic Panel (EP - 1)   Result Value Ref Range    Sodium 135 (L) 136 - 145 mmol/L    Potassium 7.1 (HH) 3.5 - 5.1 mmol/L    Chloride 102 99 - 110 mmol/L    CO2 16 (L) 21 - 32 mmol/L    Anion Gap 17 (H) 3 - 16    Glucose 99 70 - 99 mg/dL    BUN 18 7 - 20 mg/dL    CREATININE 0.6 0.6 - 1.2 mg/dL    GFR Non-African American >60 >60    GFR African American >60 >60    Calcium 9.9 8.3 - 10.6 mg/dL   Urinalysis, reflex to microscopic (Lab)   Result Value Ref Range    Color, UA Yellow Straw/Yellow    Clarity, UA Clear Clear    Glucose, Ur Negative Negative mg/dL    Bilirubin Urine Negative Negative    Ketones, Urine Negative Negative mg/dL    Specific Gravity, UA 1.020 1.005 - 1.030    Blood, Urine MODERATE (A) Negative    pH, UA 6.0 5.0 - 8.0    Protein, UA TRACE (A) Negative mg/dL    Urobilinogen, Urine 0.2 <2.0 E.U./dL    Nitrite, Urine POSITIVE (A) Negative    Leukocyte Esterase, Urine MODERATE (A) Negative    Microscopic Examination YES     Urine Type Voided    Troponin   Result Value Ref Range    Troponin <0.01 <0.01 ng/mL   Potassium (Lab)   Result Value Ref Range    Potassium 4.5 3.5 - 5.1 mmol/L   Microscopic Urinalysis   Result Value Ref Range    Mucus, UA 1+ (A) None Seen /LPF    WBC, UA >100 (A) 0 - 5 /HPF    RBC, UA see below (A) 0 - 4 /HPF    Epithelial Cells, UA 6-10 (A) 0 - 5 /HPF    Bacteria, UA 3+ (A) None Seen /HPF     RECENT VITALS:  BP: 125/60,Temp: 97.6 °F (36.4 °C), Pulse: 73, Resp: 20, SpO2: 97 %     Procedures     N/A    ED Course     Nursing Notes, Past Medical Hx, Past Surgical Hx, Social Hx,Allergies, and Family Hx were reviewed. patient was given the following medications:  Orders Placed This Encounter   Medications    morphine injection 4 mg    iopamidol (ISOVUE-370) 76 % injection 80 mL    fentaNYL (SUBLIMAZE) injection 25 mcg       CONSULTS:  None    MEDICAL DECISIONMAKING / ASSESSMENT / Jocelin Norma is a 68 y.o. female presents complaining of left shoulder pain after fall. Patient states she had to get up to go to the bathroom and fell due to her neuropathy. She denies loss of consciousness. She is complaining of headache as well as pain to the left shoulder where she had a known comminuted fracture. Patient has no new trauma on exam.  CT scan of head and cervical spine showed no acute abnormalities. X-ray of the left shoulder demonstrates comminuted and impacted fracture involving the proximal left humerus with new bone formation indicating either subacute fracture, incompletely healed, or acute on chronic fracture. I favor that this is incomplete healed fractured based on the patient's description of having continued issues with the shoulder. Chest x-ray showed questionable left lower lobe airspace disease versus effusion. CT scan of the chest, abdomen, and pelvis were obtained that showed no acute traumatic abnormalities. Patient's laboratory studies are unremarkable.   Urinalysis does show evidence of urinary tract infection with patient states that she was just recently started and on antibiotics for this so we will hold on changing her antibiotics. Patient will be instructed to follow-up with her primary care provider as well as her orthopedic surgeon for further evaluation. Clinical Impression     1. Contusion of multiple sites of left shoulder and upper arm, initial encounter    2.  Acute cystitis without hematuria        Disposition     PATIENT REFERRED TO:  Saad Deleon MD  [address] [place] [address] [id number]  [id number]          Teresa Valencia MD            DISCHARGE MEDICATIONS:  Current Discharge Medication List          DISPOSITION Decision To Discharge 09/20/2021 06:24:54 AM        David Grant MD  09/20/21 [id number]

## 2021-10-07 ENCOUNTER — HOSPITAL ENCOUNTER (INPATIENT)
Age: 76
LOS: 3 days | Discharge: NURSING, REHAB OR LONG-TERM CARE | DRG: 690 | End: 2021-10-11
Attending: EMERGENCY MEDICINE | Admitting: INTERNAL MEDICINE
Payer: MEDICARE

## 2021-10-07 DIAGNOSIS — F13.20 BENZODIAZEPINE DEPENDENCE (HCC): Primary | ICD-10-CM

## 2021-10-07 DIAGNOSIS — F41.9 ANXIETY: ICD-10-CM

## 2021-10-07 DIAGNOSIS — R29.6 FREQUENT FALLS: ICD-10-CM
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
## 2021-10-07 DIAGNOSIS — N39.0 URINARY TRACT INFECTION WITHOUT HEMATURIA, SITE UNSPECIFIED: ICD-10-CM

## 2021-10-07 DIAGNOSIS — R19.7 DIARRHEA, UNSPECIFIED TYPE: ICD-10-CM

## 2021-10-07 PROCEDURE — 96360 HYDRATION IV INFUSION INIT: CPT

## 2021-10-07 PROCEDURE — 96372 THER/PROPH/DIAG INJ SC/IM: CPT

## 2021-10-07 PROCEDURE — 96361 HYDRATE IV INFUSION ADD-ON: CPT

## 2021-10-07 PROCEDURE — 99284 EMERGENCY DEPT VISIT MOD MDM: CPT

## 2021-10-08 ENCOUNTER — APPOINTMENT (OUTPATIENT)
Dept: CT IMAGING | Age: 76
DRG: 690 | End: 2021-10-08
Payer: MEDICARE

## 2021-10-08 PROBLEM — N39.0 UTI (URINARY TRACT INFECTION): Status: ACTIVE | Noted: 2021-10-08

## 2021-10-08 PROBLEM — G93.41 ACUTE METABOLIC ENCEPHALOPATHY: Status: ACTIVE | Noted: 2021-10-08

## 2021-10-08 LAB
ANION GAP SERPL CALCULATED.3IONS-SCNC: 12 MMOL/L (ref 3–16)
BACTERIA: ABNORMAL /HPF
BASOPHILS ABSOLUTE: 0 K/UL (ref 0–0.2)
BASOPHILS RELATIVE PERCENT: 0.6 %
BILIRUBIN URINE: NEGATIVE
BLOOD, URINE: ABNORMAL
BUN BLDV-MCNC: 11 MG/DL (ref 7–20)
C DIFF TOXIN/ANTIGEN: NORMAL
CALCIUM SERPL-MCNC: 8.9 MG/DL (ref 8.3–10.6)
CHLORIDE BLD-SCNC: 100 MMOL/L (ref 99–110)
CLARITY: CLEAR
CO2: 26 MMOL/L (ref 21–32)
COLOR: YELLOW
CREAT SERPL-MCNC: <0.5 MG/DL (ref 0.6–1.2)
EOSINOPHILS ABSOLUTE: 0.1 K/UL (ref 0–0.6)
EOSINOPHILS RELATIVE PERCENT: 3.2 %
GFR AFRICAN AMERICAN: >60
GFR NON-AFRICAN AMERICAN: >60
GLUCOSE BLD-MCNC: 89 MG/DL (ref 70–99)
GLUCOSE BLD-MCNC: 98 MG/DL (ref 70–99)
GLUCOSE BLD-MCNC: 99 MG/DL (ref 70–99)
GLUCOSE URINE: NEGATIVE MG/DL
HCT VFR BLD CALC: 34 % (ref 36–48)
HEMOGLOBIN: 11.4 G/DL (ref 12–16)
KETONES, URINE: NEGATIVE MG/DL
LEUKOCYTE ESTERASE, URINE: ABNORMAL
LYMPHOCYTES ABSOLUTE: 0.6 K/UL (ref 1–5.1)
LYMPHOCYTES RELATIVE PERCENT: 13 %
MCH RBC QN AUTO: 32 PG (ref 26–34)
MCHC RBC AUTO-ENTMCNC: 33.6 G/DL (ref 31–36)
MCV RBC AUTO: 95.4 FL (ref 80–100)
MICROSCOPIC EXAMINATION: YES
MONOCYTES ABSOLUTE: 0.4 K/UL (ref 0–1.3)
MONOCYTES RELATIVE PERCENT: 8.1 %
NEUTROPHILS ABSOLUTE: 3.3 K/UL (ref 1.7–7.7)
NEUTROPHILS RELATIVE PERCENT: 75.1 %
NITRITE, URINE: POSITIVE
PDW BLD-RTO: 20.5 % (ref 12.4–15.4)
PERFORMED ON: NORMAL
PERFORMED ON: NORMAL
PH UA: 6.5 (ref 5–8)
PLATELET # BLD: 197 K/UL (ref 135–450)
PMV BLD AUTO: 7.8 FL (ref 5–10.5)
POC OCCULT BLOOD STOOL: NEGATIVE
POTASSIUM REFLEX MAGNESIUM: 4.7 MMOL/L (ref 3.5–5.1)
PROTEIN UA: NEGATIVE MG/DL
RBC # BLD: 3.56 M/UL (ref 4–5.2)
RBC UA: ABNORMAL /HPF (ref 0–4)
SODIUM BLD-SCNC: 138 MMOL/L (ref 136–145)
SPECIFIC GRAVITY UA: <=1.005 (ref 1–1.03)
URINE REFLEX TO CULTURE: YES
URINE TYPE: ABNORMAL
UROBILINOGEN, URINE: 0.2 E.U./DL
WBC # BLD: 4.5 K/UL (ref 4–11)
WBC UA: ABNORMAL /HPF (ref 0–5)

## 2021-10-08 PROCEDURE — 2580000003 HC RX 258: Performed by: PHYSICIAN ASSISTANT

## 2021-10-08 PROCEDURE — 6370000000 HC RX 637 (ALT 250 FOR IP): Performed by: PHYSICIAN ASSISTANT

## 2021-10-08 PROCEDURE — 87449 NOS EACH ORGANISM AG IA: CPT

## 2021-10-08 PROCEDURE — 83036 HEMOGLOBIN GLYCOSYLATED A1C: CPT

## 2021-10-08 PROCEDURE — 2580000003 HC RX 258: Performed by: INTERNAL MEDICINE

## 2021-10-08 PROCEDURE — 74177 CT ABD & PELVIS W/CONTRAST: CPT

## 2021-10-08 PROCEDURE — 36415 COLL VENOUS BLD VENIPUNCTURE: CPT

## 2021-10-08 PROCEDURE — 87209 SMEAR COMPLEX STAIN: CPT

## 2021-10-08 PROCEDURE — 6370000000 HC RX 637 (ALT 250 FOR IP): Performed by: INTERNAL MEDICINE

## 2021-10-08 PROCEDURE — 6360000002 HC RX W HCPCS: Performed by: INTERNAL MEDICINE

## 2021-10-08 PROCEDURE — 82607 VITAMIN B-12: CPT

## 2021-10-08 PROCEDURE — 84443 ASSAY THYROID STIM HORMONE: CPT

## 2021-10-08 PROCEDURE — 81001 URINALYSIS AUTO W/SCOPE: CPT

## 2021-10-08 PROCEDURE — 87177 OVA AND PARASITES SMEARS: CPT

## 2021-10-08 PROCEDURE — 85025 COMPLETE CBC W/AUTO DIFF WBC: CPT

## 2021-10-08 PROCEDURE — 87425 ROTAVIRUS AG IA: CPT

## 2021-10-08 PROCEDURE — 87186 SC STD MICRODIL/AGAR DIL: CPT

## 2021-10-08 PROCEDURE — 87505 NFCT AGENT DETECTION GI: CPT

## 2021-10-08 PROCEDURE — 80048 BASIC METABOLIC PNL TOTAL CA: CPT

## 2021-10-08 PROCEDURE — 87086 URINE CULTURE/COLONY COUNT: CPT

## 2021-10-08 PROCEDURE — 6360000002 HC RX W HCPCS: Performed by: PHYSICIAN ASSISTANT

## 2021-10-08 PROCEDURE — 82272 OCCULT BLD FECES 1-3 TESTS: CPT

## 2021-10-08 PROCEDURE — 87324 CLOSTRIDIUM AG IA: CPT

## 2021-10-08 PROCEDURE — U0003 INFECTIOUS AGENT DETECTION BY NUCLEIC ACID (DNA OR RNA); SEVERE ACUTE RESPIRATORY SYNDROME CORONAVIRUS 2 (SARS-COV-2) (CORONAVIRUS DISEASE [COVID-19]), AMPLIFIED PROBE TECHNIQUE, MAKING USE OF HIGH THROUGHPUT TECHNOLOGIES AS DESCRIBED BY CMS-2020-01-R: HCPCS

## 2021-10-08 PROCEDURE — 6360000004 HC RX CONTRAST MEDICATION: Performed by: PHYSICIAN ASSISTANT

## 2021-10-08 PROCEDURE — 87077 CULTURE AEROBIC IDENTIFY: CPT

## 2021-10-08 PROCEDURE — 1200000000 HC SEMI PRIVATE

## 2021-10-08 PROCEDURE — U0005 INFEC AGEN DETEC AMPLI PROBE: HCPCS

## 2021-10-08 RX ORDER — CITALOPRAM 20 MG/1
20 TABLET ORAL DAILY
Status: DISCONTINUED | OUTPATIENT
Start: 2021-10-08 | End: 2021-10-11 | Stop reason: HOSPADM

## 2021-10-08 RX ORDER — INSULIN LISPRO 100 [IU]/ML
0-6 INJECTION, SOLUTION INTRAVENOUS; SUBCUTANEOUS
Status: DISCONTINUED | OUTPATIENT
Start: 2021-10-08 | End: 2021-10-09

## 2021-10-08 RX ORDER — LORAZEPAM 2 MG/ML
1 INJECTION INTRAMUSCULAR
Status: DISCONTINUED | OUTPATIENT
Start: 2021-10-08 | End: 2021-10-11 | Stop reason: HOSPADM

## 2021-10-08 RX ORDER — ACETAMINOPHEN 325 MG/1
650 TABLET ORAL EVERY 4 HOURS PRN
Status: DISCONTINUED | OUTPATIENT
Start: 2021-10-08 | End: 2021-10-11 | Stop reason: HOSPADM

## 2021-10-08 RX ORDER — GAUZE BANDAGE 2" X 2"
100 BANDAGE TOPICAL DAILY
Status: DISCONTINUED | OUTPATIENT
Start: 2021-10-08 | End: 2021-10-11 | Stop reason: HOSPADM

## 2021-10-08 RX ORDER — SODIUM CHLORIDE 0.9 % (FLUSH) 0.9 %
5-40 SYRINGE (ML) INJECTION EVERY 12 HOURS SCHEDULED
Status: DISCONTINUED | OUTPATIENT
Start: 2021-10-08 | End: 2021-10-11 | Stop reason: HOSPADM

## 2021-10-08 RX ORDER — LEVOTHYROXINE SODIUM 137 UG/1
137 TABLET ORAL DAILY
Status: DISCONTINUED | OUTPATIENT
Start: 2021-10-08 | End: 2021-10-11 | Stop reason: HOSPADM

## 2021-10-08 RX ORDER — DEXTROSE MONOHYDRATE 25 G/50ML
12.5 INJECTION, SOLUTION INTRAVENOUS PRN
Status: DISCONTINUED | OUTPATIENT
Start: 2021-10-08 | End: 2021-10-11 | Stop reason: HOSPADM

## 2021-10-08 RX ORDER — ALPRAZOLAM 0.5 MG/1
0.5 TABLET ORAL ONCE
Status: COMPLETED | OUTPATIENT
Start: 2021-10-08 | End: 2021-10-08

## 2021-10-08 RX ORDER — MULTIVITAMIN WITH IRON
1 TABLET ORAL DAILY
Status: DISCONTINUED | OUTPATIENT
Start: 2021-10-08 | End: 2021-10-11 | Stop reason: HOSPADM

## 2021-10-08 RX ORDER — LORAZEPAM 1 MG/1
4 TABLET ORAL
Status: DISCONTINUED | OUTPATIENT
Start: 2021-10-08 | End: 2021-10-11 | Stop reason: HOSPADM

## 2021-10-08 RX ORDER — LORAZEPAM 2 MG/ML
2 INJECTION INTRAMUSCULAR
Status: DISCONTINUED | OUTPATIENT
Start: 2021-10-08 | End: 2021-10-11 | Stop reason: HOSPADM

## 2021-10-08 RX ORDER — LEVOFLOXACIN 5 MG/ML
750 INJECTION, SOLUTION INTRAVENOUS EVERY 24 HOURS
Status: DISCONTINUED | OUTPATIENT
Start: 2021-10-08 | End: 2021-10-08

## 2021-10-08 RX ORDER — LEVOFLOXACIN 5 MG/ML
750 INJECTION, SOLUTION INTRAVENOUS EVERY 24 HOURS
Status: DISCONTINUED | OUTPATIENT
Start: 2021-10-09 | End: 2021-10-09

## 2021-10-08 RX ORDER — NICOTINE POLACRILEX 4 MG
15 LOZENGE BUCCAL PRN
Status: DISCONTINUED | OUTPATIENT
Start: 2021-10-08 | End: 2021-10-11 | Stop reason: HOSPADM

## 2021-10-08 RX ORDER — FUROSEMIDE 40 MG/1
40 TABLET ORAL DAILY
Status: DISCONTINUED | OUTPATIENT
Start: 2021-10-08 | End: 2021-10-11 | Stop reason: HOSPADM

## 2021-10-08 RX ORDER — LORAZEPAM 1 MG/1
2 TABLET ORAL
Status: DISCONTINUED | OUTPATIENT
Start: 2021-10-08 | End: 2021-10-11 | Stop reason: HOSPADM

## 2021-10-08 RX ORDER — FERROUS SULFATE 325(65) MG
325 TABLET ORAL 2 TIMES DAILY
Status: DISCONTINUED | OUTPATIENT
Start: 2021-10-08 | End: 2021-10-11 | Stop reason: HOSPADM

## 2021-10-08 RX ORDER — LORAZEPAM 2 MG/ML
4 INJECTION INTRAMUSCULAR
Status: DISCONTINUED | OUTPATIENT
Start: 2021-10-08 | End: 2021-10-11 | Stop reason: HOSPADM

## 2021-10-08 RX ORDER — DICYCLOMINE HCL 20 MG
20 TABLET ORAL 4 TIMES DAILY PRN
Status: DISCONTINUED | OUTPATIENT
Start: 2021-10-08 | End: 2021-10-11 | Stop reason: HOSPADM

## 2021-10-08 RX ORDER — LORAZEPAM 1 MG/1
1 TABLET ORAL
Status: DISCONTINUED | OUTPATIENT
Start: 2021-10-08 | End: 2021-10-11 | Stop reason: HOSPADM

## 2021-10-08 RX ORDER — LORAZEPAM 2 MG/ML
3 INJECTION INTRAMUSCULAR
Status: DISCONTINUED | OUTPATIENT
Start: 2021-10-08 | End: 2021-10-11 | Stop reason: HOSPADM

## 2021-10-08 RX ORDER — ATORVASTATIN CALCIUM 20 MG/1
10 TABLET, FILM COATED ORAL NIGHTLY
Status: DISCONTINUED | OUTPATIENT
Start: 2021-10-08 | End: 2021-10-11 | Stop reason: HOSPADM

## 2021-10-08 RX ORDER — ASPIRIN 81 MG/1
81 TABLET ORAL DAILY
Status: DISCONTINUED | OUTPATIENT
Start: 2021-10-08 | End: 2021-10-11 | Stop reason: HOSPADM

## 2021-10-08 RX ORDER — INSULIN LISPRO 100 [IU]/ML
0-3 INJECTION, SOLUTION INTRAVENOUS; SUBCUTANEOUS NIGHTLY
Status: DISCONTINUED | OUTPATIENT
Start: 2021-10-08 | End: 2021-10-09

## 2021-10-08 RX ORDER — SODIUM CHLORIDE, SODIUM LACTATE, POTASSIUM CHLORIDE, CALCIUM CHLORIDE 600; 310; 30; 20 MG/100ML; MG/100ML; MG/100ML; MG/100ML
1000 INJECTION, SOLUTION INTRAVENOUS ONCE
Status: COMPLETED | OUTPATIENT
Start: 2021-10-08 | End: 2021-10-08

## 2021-10-08 RX ORDER — SODIUM CHLORIDE 0.9 % (FLUSH) 0.9 %
5-40 SYRINGE (ML) INJECTION PRN
Status: DISCONTINUED | OUTPATIENT
Start: 2021-10-08 | End: 2021-10-11 | Stop reason: HOSPADM

## 2021-10-08 RX ORDER — DEXTROSE MONOHYDRATE 50 MG/ML
100 INJECTION, SOLUTION INTRAVENOUS PRN
Status: DISCONTINUED | OUTPATIENT
Start: 2021-10-08 | End: 2021-10-11 | Stop reason: HOSPADM

## 2021-10-08 RX ORDER — LORAZEPAM 1 MG/1
3 TABLET ORAL
Status: DISCONTINUED | OUTPATIENT
Start: 2021-10-08 | End: 2021-10-11 | Stop reason: HOSPADM

## 2021-10-08 RX ORDER — SODIUM CHLORIDE 9 MG/ML
25 INJECTION, SOLUTION INTRAVENOUS PRN
Status: DISCONTINUED | OUTPATIENT
Start: 2021-10-08 | End: 2021-10-11 | Stop reason: HOSPADM

## 2021-10-08 RX ORDER — DICYCLOMINE HYDROCHLORIDE 10 MG/ML
20 INJECTION INTRAMUSCULAR ONCE
Status: COMPLETED | OUTPATIENT
Start: 2021-10-08 | End: 2021-10-08

## 2021-10-08 RX ORDER — PANTOPRAZOLE SODIUM 40 MG/1
40 TABLET, DELAYED RELEASE ORAL DAILY
Status: DISCONTINUED | OUTPATIENT
Start: 2021-10-08 | End: 2021-10-11 | Stop reason: HOSPADM

## 2021-10-08 RX ORDER — ALPRAZOLAM 0.5 MG/1
0.5 TABLET ORAL NIGHTLY PRN
Status: DISCONTINUED | OUTPATIENT
Start: 2021-10-08 | End: 2021-10-11 | Stop reason: HOSPADM

## 2021-10-08 RX ADMIN — DICYCLOMINE HYDROCHLORIDE 20 MG: 20 INJECTION, SOLUTION INTRAMUSCULAR at 00:44

## 2021-10-08 RX ADMIN — IOPAMIDOL 80 ML: 755 INJECTION, SOLUTION INTRAVENOUS at 01:02

## 2021-10-08 RX ADMIN — ALPRAZOLAM 0.5 MG: 0.5 TABLET ORAL at 02:41

## 2021-10-08 RX ADMIN — CITALOPRAM HYDROBROMIDE 20 MG: 20 TABLET ORAL at 08:56

## 2021-10-08 RX ADMIN — Medication 10 ML: at 20:43

## 2021-10-08 RX ADMIN — ATORVASTATIN CALCIUM 10 MG: 20 TABLET, FILM COATED ORAL at 20:41

## 2021-10-08 RX ADMIN — ACETAMINOPHEN 650 MG: 325 TABLET ORAL at 08:56

## 2021-10-08 RX ADMIN — LEVOFLOXACIN 750 MG: 5 INJECTION, SOLUTION INTRAVENOUS at 04:37

## 2021-10-08 RX ADMIN — SODIUM CHLORIDE, POTASSIUM CHLORIDE, SODIUM LACTATE AND CALCIUM CHLORIDE 1000 ML: 600; 310; 30; 20 INJECTION, SOLUTION INTRAVENOUS at 00:52

## 2021-10-08 RX ADMIN — FUROSEMIDE 40 MG: 40 TABLET ORAL at 08:56

## 2021-10-08 RX ADMIN — LEVOTHYROXINE SODIUM 137 MCG: 0.14 TABLET ORAL at 09:29

## 2021-10-08 RX ADMIN — ALPRAZOLAM 0.5 MG: 0.5 TABLET ORAL at 00:44

## 2021-10-08 RX ADMIN — ASPIRIN 81 MG: 81 TABLET, COATED ORAL at 08:56

## 2021-10-08 RX ADMIN — PANTOPRAZOLE SODIUM 40 MG: 40 TABLET, DELAYED RELEASE ORAL at 08:56

## 2021-10-08 RX ADMIN — ALPRAZOLAM 0.5 MG: 0.5 TABLET ORAL at 20:42

## 2021-10-08 ASSESSMENT — PAIN DESCRIPTION - ORIENTATION
ORIENTATION: LEFT
ORIENTATION: LEFT

## 2021-10-08 ASSESSMENT — PAIN - FUNCTIONAL ASSESSMENT: PAIN_FUNCTIONAL_ASSESSMENT: PREVENTS OR INTERFERES SOME ACTIVE ACTIVITIES AND ADLS

## 2021-10-08 ASSESSMENT — PAIN DESCRIPTION - FREQUENCY
FREQUENCY: CONTINUOUS
FREQUENCY: CONTINUOUS

## 2021-10-08 ASSESSMENT — PAIN DESCRIPTION - LOCATION
LOCATION: ARM
LOCATION: SHOULDER

## 2021-10-08 ASSESSMENT — PAIN DESCRIPTION - DESCRIPTORS
DESCRIPTORS: ACHING
DESCRIPTORS: ACHING;DISCOMFORT

## 2021-10-08 ASSESSMENT — PAIN DESCRIPTION - PAIN TYPE
TYPE: ACUTE PAIN
TYPE: ACUTE PAIN

## 2021-10-08 ASSESSMENT — PAIN DESCRIPTION - PROGRESSION
CLINICAL_PROGRESSION: GRADUALLY WORSENING
CLINICAL_PROGRESSION: GRADUALLY WORSENING

## 2021-10-08 ASSESSMENT — PAIN DESCRIPTION - ONSET
ONSET: ON-GOING
ONSET: ON-GOING

## 2021-10-08 ASSESSMENT — PAIN DESCRIPTION - DIRECTION: RADIATING_TOWARDS: L WRIST

## 2021-10-08 ASSESSMENT — PAIN SCALES - GENERAL
PAINLEVEL_OUTOF10: 0
PAINLEVEL_OUTOF10: 6
PAINLEVEL_OUTOF10: 7
PAINLEVEL_OUTOF10: 5

## 2021-10-08 NOTE — ED NOTES
Patient is vaccinated with both Pfizer vaccines, but got Covid prior to second shot per patient in Feb 2021      [facility]  10/08/21 [id number]

## 2021-10-08 NOTE — ED PROVIDER NOTES
ED Attending Attestation Note     Date of evaluation: 10/7/2021    This patient was seen by the advance practice provider. I have seen and examined the patient, agree with the workup, evaluation, management and diagnosis. The care plan has been discussed. My assessment reveals a chronically ill-appearing 42-year-old female patient who presents with worsening of her symptoms from when she was previously seen in this emergency department at the end of September. Recent diagnosis of UTI however it is unclear if she has been taking antibiotics. She additionally notes diarrhea and generalized weakness. There are concerns for safety and she has had frequent falls. .     Claudy Jones MD  10/08/21 8214

## 2021-10-08 NOTE — H&P
History and Physical    Admit Date: 10/7/2021    Patient's PCP: Dr. Belva Primrose, MD       Chief complaints: Weakness, falls    HISTORY OF PRESENT ILLNESS:    This is a very pleasant 68 y.o. female with a history of DM type 2 complicated by peripheral neuropathy, COVID 19 earlier in the year, pulmonary fibrosis, home O2 dependent, hypothyroidism,  Obesity, and recurrent falls  who presented to the emergency department  with left shoulder pain after fall. She has  difficulty walking at baseline attributed to neuropathy. She got up to go to the bathroom and lost her balance and fell between dresser and bed. She is complaining of headache as well as increased pain to her left shoulder where she was diagnosed with a humerus fracture following a fall 3 months ago. She denies loss of consciousness. She admits to having been weak, and per squad patient was unable to stand. Pxt herself seems a bit confused and tells me she is not here for falls. She says she is here for diarrhea, and due to need by facility to r/o Covid due to her recent exposure. She says she has had recent diahrea, and could not make it to the rest room at [facility], soiling her room multiple times. She denies chest pain,  shortness of breath dizziness or lightheadedness prior to or since the fall. She has not had abd pain, nausea, vomiting, or diarrhea. She was noted to have oxygen levels in the high 80s to low 90s but states she has a history of pulmonary fibrosis and does use supplemental oxygen as needed. In the ED, she was afebrile. Vitals were satisfactory. Hgb was 11.4, down from 13.3 9/2021. BMP was WNL. CT a/P showed Cardiomegaly as well as Hepatomegaly and diffuse steatosis and other chronic changes including stable adrenal nodule and \"Mild retroperitoneal and iliac lymphadenopathy unchanged\". At this time she denies new complaints.        Past Medical / Surgical History:    Past Medical History:   Diagnosis Date    COVID-19 02/2021    Diabetes mellitus ([facility] [address]     Hyperlipidemia     Irritable bowel syndrome     Obese     On home oxygen therapy     PRN- pt states not really using    Pulmonary fibrosis (HCC)     Thyroid disease     UTI (urinary tract infection)        Past Surgical History:   Procedure Laterality Date    COLONOSCOPY      UPPER GASTROINTESTINAL ENDOSCOPY         Medications Prior to Admission:    No current facility-administered medications on file prior to encounter. Current Outpatient Medications on File Prior to Encounter   Medication Sig Dispense Refill    ALPRAZolam (XANAX) 0.5 MG tablet Take 0.5 mg by mouth nightly as needed for Sleep.  dicyclomine (BENTYL) 20 MG tablet Take 20 mg by mouth every 4-6 hours as needed      citalopram (CELEXA) 20 MG tablet Take 20 mg by mouth daily      levothyroxine (SYNTHROID) 137 MCG tablet Take 137 mcg by mouth Daily      metFORMIN (GLUCOPHAGE) 1000 MG tablet Take 1,000 mg by mouth 2 times daily (with meals)      pantoprazole (PROTONIX) 40 MG tablet Take 40 mg by mouth daily      simvastatin (ZOCOR) 20 MG tablet Take 20 mg by mouth nightly      Ergocalciferol (VITAMIN D2 PO) Take 50,000 Units by mouth once a week      furosemide (LASIX) 40 MG tablet Take 1 tablet by mouth daily 60 tablet 3    ferrous sulfate 325 (65 Fe) MG tablet Take 1 tablet by mouth 2 times daily 180 tablet 3    aspirin 81 MG tablet Take 81 mg by mouth daily         Allergies:  Lisinopril and Pcn [penicillins]    Social History:   TOBACCO:   reports that she has never smoked. She has never used smokeless tobacco.     ETOH:   reports current alcohol use. Family History:   History reviewed. No pertinent family history. ROS: Review of Systems - Negative except as in HPI. .   All other systems reviewed and are negative.     PHYSICAL EXAM:  BP (!) 122/55   Pulse 62   Temp 98.2 °F (36.8 °C) (Oral)   Resp 20   Ht 5' 8\" (1.727 m)   Wt 164 lb (74.4 kg)   SpO2 92% Breastfeeding No   BMI 24.94 kg/m²     No results for input(s): POCGLU in the last 72 hours. General appearance: alert, appears stated age and cooperative  Head: Normocephalic, without obvious abnormality, atraumatic  Eyes: conjunctivae/corneas clear. PERRL, EOM's intact. Fundi benign. Neck: no adenopathy, no carotid bruit, no JVD, supple, symmetrical, trachea midline and thyroid not enlarged, symmetric, no tenderness/mass/nodules  Lungs: clear to auscultation bilaterally  Heart: regular rate and rhythm, S1, S2 normal, no murmur, click, rub or gallop  Abdomen: soft, non-tender; bowel sounds normal; no masses,  no organomegaly  Extremities: extremities normal, atraumatic, no cyanosis or edema  Pulses: 2+ and symmetric  Skin: Skin color, texture, turgor normal. No rashes or lesions  Neurologic: Grossly normal    LABS:    Recent Labs     10/08/21  0023   WBC 4.5   HGB 11.4*   HCT 34.0*                                                                     Recent Labs     10/08/21  0023      K 4.7      CO2 26   BUN 11   CREATININE <0.5*   GLUCOSE 98     No results for input(s): AST, ALT, ALB, BILITOT, ALKPHOS in the last 72 hours. No results for input(s): TROPONINI in the last 72 hours. No results for input(s): BNP in the last 72 hours. No results found for: PHART, [id number], PO2ART  No results for input(s): INR in the last 72 hours. Recent Labs     10/08/21  0159   COLORU Yellow   PHUR 6.5   WBCUA *   RBCUA 11-20*   BACTERIA 4+*   CLARITYU Clear   SPECGRAV <=1.005   LEUKOCYTESUR LARGE*   UROBILINOGEN 0.2   BILIRUBINUR Negative   BLOODU MODERATE*   GLUCOSEU Negative          Assessment & Plan:     68 y.o. female who presented to the emergency department  with left shoulder pain after fall.           Complicated UTI  - Send urine culture  - Continue IV antibx  - Monitor for retention      Recurrent falls:   - Probably multifactorial: Underlying UTI with chronic benzodiazepines use, alcohol need SNF placement    Junior Renee MD

## 2021-10-08 NOTE — PROGRESS NOTES
Levaquin 500 mg IV q24h ordered for patient. This medication is renally eliminated. Will change to Levaquin 750 mg IV q24h per renal dose adjustment policy based on renal function and indication (complicated UTI). CrCL ~ 97 mL/min (SCr <0.5 mg/dL)    Please call with any questions.   Robin Romo, PharmD, BCPS  Main pharmacy: [id number]  10/8/2021 2:36 PM

## 2021-10-08 NOTE — PROGRESS NOTES
Pt admitted to room 6389. Droplet & fall precautions placed. VS as noted in chart. Admission assessment complete. Pt oriented to room and instructed to use the call light when needing assistance. Pt has no further questions at this time. Will continue to monitor.

## 2021-10-09 ENCOUNTER — APPOINTMENT (OUTPATIENT)
Dept: GENERAL RADIOLOGY | Age: 76
DRG: 690 | End: 2021-10-09
Payer: MEDICARE

## 2021-10-09 LAB
ANION GAP SERPL CALCULATED.3IONS-SCNC: 15 MMOL/L (ref 3–16)
ANISOCYTOSIS: ABNORMAL
BASOPHILS ABSOLUTE: 0 K/UL (ref 0–0.2)
BASOPHILS RELATIVE PERCENT: 0 %
BUN BLDV-MCNC: 8 MG/DL (ref 7–20)
CALCIUM SERPL-MCNC: 8.8 MG/DL (ref 8.3–10.6)
CHLORIDE BLD-SCNC: 93 MMOL/L (ref 99–110)
CO2: 28 MMOL/L (ref 21–32)
CORTISOL TOTAL: 10.9 UG/DL
CREAT SERPL-MCNC: 0.7 MG/DL (ref 0.6–1.2)
EOSINOPHILS ABSOLUTE: 0 K/UL (ref 0–0.6)
EOSINOPHILS RELATIVE PERCENT: 0 %
ESTIMATED AVERAGE GLUCOSE: 91.1 MG/DL
GFR AFRICAN AMERICAN: >60
GFR NON-AFRICAN AMERICAN: >60
GI BACTERIAL PATHOGENS BY PCR: NORMAL
GLUCOSE BLD-MCNC: 83 MG/DL (ref 70–99)
GLUCOSE BLD-MCNC: 88 MG/DL (ref 70–99)
GLUCOSE BLD-MCNC: 94 MG/DL (ref 70–99)
GLUCOSE BLD-MCNC: 98 MG/DL (ref 70–99)
HBA1C MFR BLD: 4.8 %
HCT VFR BLD CALC: 36.8 % (ref 36–48)
HEMOGLOBIN: 12 G/DL (ref 12–16)
LYMPHOCYTES ABSOLUTE: 0.7 K/UL (ref 1–5.1)
LYMPHOCYTES RELATIVE PERCENT: 14 %
MCH RBC QN AUTO: 31.8 PG (ref 26–34)
MCHC RBC AUTO-ENTMCNC: 32.7 G/DL (ref 31–36)
MCV RBC AUTO: 97.2 FL (ref 80–100)
MONOCYTES ABSOLUTE: 0 K/UL (ref 0–1.3)
MONOCYTES RELATIVE PERCENT: 1 %
NEUTROPHILS ABSOLUTE: 4 K/UL (ref 1.7–7.7)
NEUTROPHILS RELATIVE PERCENT: 85 %
PDW BLD-RTO: 21 % (ref 12.4–15.4)
PERFORMED ON: NORMAL
PLATELET # BLD: 189 K/UL (ref 135–450)
PMV BLD AUTO: 8.1 FL (ref 5–10.5)
POTASSIUM SERPL-SCNC: 3.6 MMOL/L (ref 3.5–5.1)
RBC # BLD: 3.79 M/UL (ref 4–5.2)
SARS-COV-2: NOT DETECTED
SODIUM BLD-SCNC: 136 MMOL/L (ref 136–145)
TSH REFLEX: 1.04 UIU/ML (ref 0.27–4.2)
VITAMIN B-12: 179 PG/ML (ref 211–911)
WBC # BLD: 4.7 K/UL (ref 4–11)

## 2021-10-09 PROCEDURE — 2580000003 HC RX 258: Performed by: INTERNAL MEDICINE

## 2021-10-09 PROCEDURE — 73030 X-RAY EXAM OF SHOULDER: CPT

## 2021-10-09 PROCEDURE — 36415 COLL VENOUS BLD VENIPUNCTURE: CPT

## 2021-10-09 PROCEDURE — 6370000000 HC RX 637 (ALT 250 FOR IP): Performed by: INTERNAL MEDICINE

## 2021-10-09 PROCEDURE — 97530 THERAPEUTIC ACTIVITIES: CPT

## 2021-10-09 PROCEDURE — 85025 COMPLETE CBC W/AUTO DIFF WBC: CPT

## 2021-10-09 PROCEDURE — 82533 TOTAL CORTISOL: CPT

## 2021-10-09 PROCEDURE — 93005 ELECTROCARDIOGRAM TRACING: CPT | Performed by: INTERNAL MEDICINE

## 2021-10-09 PROCEDURE — 97535 SELF CARE MNGMENT TRAINING: CPT

## 2021-10-09 PROCEDURE — 80048 BASIC METABOLIC PNL TOTAL CA: CPT

## 2021-10-09 PROCEDURE — 97166 OT EVAL MOD COMPLEX 45 MIN: CPT

## 2021-10-09 PROCEDURE — 97162 PT EVAL MOD COMPLEX 30 MIN: CPT

## 2021-10-09 PROCEDURE — 97116 GAIT TRAINING THERAPY: CPT

## 2021-10-09 PROCEDURE — 1200000000 HC SEMI PRIVATE

## 2021-10-09 PROCEDURE — 6360000002 HC RX W HCPCS: Performed by: INTERNAL MEDICINE

## 2021-10-09 RX ORDER — LEVOFLOXACIN 5 MG/ML
500 INJECTION, SOLUTION INTRAVENOUS EVERY 24 HOURS
Status: DISCONTINUED | OUTPATIENT
Start: 2021-10-10 | End: 2021-10-09

## 2021-10-09 RX ORDER — LEVOFLOXACIN 5 MG/ML
750 INJECTION, SOLUTION INTRAVENOUS EVERY 24 HOURS
Status: DISCONTINUED | OUTPATIENT
Start: 2021-10-10 | End: 2021-10-11 | Stop reason: HOSPADM

## 2021-10-09 RX ORDER — CYANOCOBALAMIN 1000 UG/ML
1000 INJECTION INTRAMUSCULAR; SUBCUTANEOUS DAILY
Status: DISCONTINUED | OUTPATIENT
Start: 2021-10-09 | End: 2021-10-11 | Stop reason: HOSPADM

## 2021-10-09 RX ORDER — OXYCODONE HYDROCHLORIDE 5 MG/1
5 TABLET ORAL EVERY 8 HOURS PRN
Status: DISCONTINUED | OUTPATIENT
Start: 2021-10-09 | End: 2021-10-11 | Stop reason: HOSPADM

## 2021-10-09 RX ADMIN — ALPRAZOLAM 0.5 MG: 0.5 TABLET ORAL at 20:58

## 2021-10-09 RX ADMIN — CYANOCOBALAMIN 1000 MCG: 1000 INJECTION, SOLUTION INTRAMUSCULAR; SUBCUTANEOUS at 15:48

## 2021-10-09 RX ADMIN — OXYCODONE 5 MG: 5 TABLET ORAL at 20:58

## 2021-10-09 RX ADMIN — Medication 10 ML: at 08:16

## 2021-10-09 RX ADMIN — PANTOPRAZOLE SODIUM 40 MG: 40 TABLET, DELAYED RELEASE ORAL at 08:14

## 2021-10-09 RX ADMIN — Medication 10 ML: at 20:59

## 2021-10-09 RX ADMIN — LEVOFLOXACIN 750 MG: 750 INJECTION, SOLUTION INTRAVENOUS at 08:21

## 2021-10-09 RX ADMIN — FUROSEMIDE 40 MG: 40 TABLET ORAL at 08:14

## 2021-10-09 RX ADMIN — THIAMINE HCL TAB 100 MG 100 MG: 100 TAB at 08:14

## 2021-10-09 RX ADMIN — LORAZEPAM 1 MG: 1 TABLET ORAL at 15:48

## 2021-10-09 RX ADMIN — FERROUS SULFATE TAB 325 MG (65 MG ELEMENTAL FE) 325 MG: 325 (65 FE) TAB at 08:14

## 2021-10-09 RX ADMIN — ATORVASTATIN CALCIUM 10 MG: 20 TABLET, FILM COATED ORAL at 20:58

## 2021-10-09 RX ADMIN — LEVOTHYROXINE SODIUM 137 MCG: 0.14 TABLET ORAL at 06:03

## 2021-10-09 RX ADMIN — LORAZEPAM 1 MG: 1 TABLET ORAL at 02:06

## 2021-10-09 RX ADMIN — ASPIRIN 81 MG: 81 TABLET, COATED ORAL at 08:14

## 2021-10-09 RX ADMIN — CITALOPRAM HYDROBROMIDE 20 MG: 20 TABLET ORAL at 08:14

## 2021-10-09 RX ADMIN — THERA TABS 1 TABLET: TAB at 08:14

## 2021-10-09 ASSESSMENT — PAIN SCALES - GENERAL
PAINLEVEL_OUTOF10: 2
PAINLEVEL_OUTOF10: 5

## 2021-10-09 NOTE — PROGRESS NOTES
Pt refused to go down for xray. When questioned, pt states \"my doctor wanted one in 4 weeks, so I'm not going to get one now. I'm going by my doctor's orders. \" Dr. Jose Poole notified via perfect serve, replied: \"They wanted one in 4 weeks that was 4 weeks ago. So should be now. \" Instructed pt on this and now she is stating \"my doctor wanted one in 4-6 weeks. \"  Reminded pt that it is the 4 week yimi currently and that her doctor would be able to get the results of her xray if done today. Pt states \"I feel like everyone is being overbearing, this was just decided minutes ago. I would like some time to think about it\". Instructed pt to let this nurse know if she changes her mind about xray and updated Dr. Jose Poole.

## 2021-10-09 NOTE — PROGRESS NOTES
Progress Note    Admit Date: 10/7/2021    Patient's PCP: Dr. Aga Saenz MD       Chief complaints: Weakness, falls      This is a very pleasant 68 y.o. female with a history of DM type 2 complicated by peripheral neuropathy, COVID 19 earlier in the year, pulmonary fibrosis, home O2 dependent, hypothyroidism,  Obesity, and recurrent falls  who presented to the emergency department  with left shoulder pain after fall. In the ED, she was afebrile. Vitals were satisfactory. Hgb was 11.4, down from 13.3 9/2021. BMP was WNL. CT a/P showed Cardiomegaly as well as Hepatomegaly and diffuse steatosis and other chronic changes including stable adrenal nodule and \"Mild retroperitoneal and iliac lymphadenopathy unchanged\". Interval HPI  No new complaints    Placed on CIWA as daughter says she has a drinking problem   Got 1 mg ativan overnight    Sitting OOB, eating lunch    No fever  No chest pain  Mental status appears improved        Medications : Reviewed      Allergies:  Lisinopril and Pcn [penicillins]        ROS: Review of Systems - Negative except as in HPI. .   All other systems reviewed and are negative.     PHYSICAL EXAM:  /78   Pulse 79   Temp 98 °F (36.7 °C) (Oral)   Resp 18   Ht 5' 8\" (1.727 m)   Wt 164 lb (74.4 kg)   SpO2 92%   Breastfeeding No   BMI 24.94 kg/m²     Recent Labs     10/08/21  1628 10/08/21  2035 10/09/21  0714 10/09/21  1149   POCGLU 89 99 98 94       General appearance: alert, appears stated age and cooperative  Neck: no adenopathy, no carotid bruit, no JVD, supple, symmetrical, trachea midline and thyroid not enlarged, symmetric, no tenderness/mass/nodules  Lungs: clear to auscultation bilaterally  Heart: regular rate and rhythm, S1, S2 normal, no murmur, click, rub or gallop  Abdomen: soft, non-tender; bowel sounds normal; no masses,  no organomegaly  Extremities: limited ROM around left shoulder  Pulses: 2+ and symmetric  Skin: Skin color, texture, turgor normal. No rashes or lesions  Neurologic: Grossly normal    LABS:    Recent Labs     10/08/21  0023   WBC 4.5   HGB 11.4*   HCT 34.0*                                                                     Recent Labs     10/08/21  0023      K 4.7      CO2 26   BUN 11   CREATININE <0.5*   GLUCOSE 98     No results for input(s): AST, ALT, ALB, BILITOT, ALKPHOS in the last 72 hours. No results for input(s): TROPONINI in the last 72 hours. No results for input(s): BNP in the last 72 hours. No results found for: PHART, DHX9KKV, PO2ART  No results for input(s): INR in the last 72 hours. Recent Labs     10/08/21  0159   COLORU Yellow   PHUR 6.5   WBCUA *   RBCUA 11-20*   BACTERIA 4+*   CLARITYU Clear   SPECGRAV <=1.005   LEUKOCYTESUR LARGE*   UROBILINOGEN 0.2   BILIRUBINUR Negative   BLOODU MODERATE*   GLUCOSEU Negative          Assessment & Plan:     68 y.o. female who presented to the emergency department  with left shoulder pain after fall. Complicated UTI  - UTI with some mental status changes  - Continue IV antibx  - Follow urine culture  - Monitor for retention      Recurrent falls:   - Probably multifactorial: Underlying UTI, Vitamin B12 deficiency, with chronic benzodiazepines use, alcohol use disorder, neuropathy from DM, B12 decficiency and deconditioning from multiple recent ortho procedures/ chronic lung disease/Pulm fibrosis  - Updated TSH: WNL; Vitamin B12: low at 173; and hgb A1C: 4.8  - AM cortisol 10.9, done with hx of adrenal nodule  - Check NH3 level with finding of significant hepatic steatosis  - Replenish B12 parenterally  - Recently followed with Ortho at [facility] for Left shoulder proximal humerus fracture treated nonoperatively: Felt to be doing well. PT/OT advised.   - Fall precautions  - Counseling re chronic benzo use.  - PT/OT eval: SNF      ETOH abuse  - Family give hx of significant  ETOH abuse  - [facility] protocol  - Thiamin supplementation, MVI  - Social works eval      Vitamin B12 deficiency. POA  - Checked Vitamin B12, with hx of recurrent falls, neuropathy, and some cognitive issues on my eval: low at 173  - Will start supplementation parenterally, in view of possible neuro effects with mental status neuropathy       Acute anemia  - Drop in hgb from 13 to 11  - Hx of Anemia that was felt likely 2/2 recent GI blood loss   - Undetermined etiology, but with hx of fall, must monitor to r/o entities like RPH  - Previous GI w/u : EGD on 6/13 at outside facility which indicated 2 colon polyps, internal hemorrhoids, high grade esophogeal stricture (chronic) and possible eosinophilic esophagitis, and severe diverticulosis  - Monitor hgb      Acute diahrea  - COVID testing sent in ED: Negative  - Send stool for C diff if watery stools  - Diahrea improved  - Lomotil PRN as/when appropriate      Chronic HF with preserved EF  - ECHO 2019: EF 55-60% with G2DD  - Fluid I/o; Dly weights      Left humerus fracture  - Fall with comminuted and impacted fracture of the proximal left humerus about a month ago. - Followed with Dr. Isabella Shell: conservatively managed, sling/ then started PT/OT  - Plan was to repeat imaging 10/8/2021  - Will obtain updated X ray  - Pain control  - PT/OT        DM type 2 complicated by peripheral neuropathy  - Update A1C: 4.8  - Holding metformin with recent contrast  - DC Insulin SS protocol  and monitor BGs with AM labs      Pulmonary fibrosis  - COVID 19 earlier in the year  - Home O2 dependent, but appears not on O2 at rest.  - Uses O2 some times with activity and PRN       Hypothyroidism  - Updated TSH: WNL      Anxiety disorder  - Benzo dependent  - Counseling re chronic benzo use. The patient and / or the family were informed of the results of any tests, a time was given to answer questions, a plan was proposed and they agreed with plan.       Code status: full code; but does not want to remain on \"any machine for long, and if it were a hopeless situation, just wants to be allowed to go. \"  [address] [facility], older daughter is POA    Prior     Disposition:   Pxt is from [facility]  PT/OT eval: SNF placement recommended, and will benefit, in view of recurrent falls. Possibly SNF in 1-2 days if agreeable.      Henrietta Snow MD

## 2021-10-09 NOTE — FLOWSHEET NOTE
10/09/21 1539   Vitals   Blood Pressure Lying 139/69   Pulse Lying 71 PER MINUTE   Blood Pressure Sitting 145/77   Pulse Sitting 68 PER MINUTE   Blood Pressure Standing 134/62   Pulse Standing 90 PER MINUTE

## 2021-10-09 NOTE — PROGRESS NOTES
infection without hematuria, site unspecified. Diagnoses of Diarrhea, unspecified type and Frequent falls were also pertinent to this visit. has a past medical history of COVID-19, Diabetes mellitus ([facility] [address] Hyperlipidemia, Irritable bowel syndrome, On home oxygen therapy, Pulmonary fibrosis ([facility] [address] Thyroid disease, and UTI (urinary tract infection). has a past surgical history that includes Upper gastrointestinal endoscopy and Colonoscopy. Treatment Diagnosis: impaired ADLs/transfers s/p fall      Restrictions  Position Activity Restriction  Other position/activity restrictions: Up with assist, seizure precautions    Subjective   General  Chart Reviewed: Yes  Additional Pertinent Hx: 68 y.o. to ED 10/8 w/ c/o left shoulder pain after fall. Hospital Course: CT Abd/Pelvis: Severe diverticulosis in the sigmoid colon; COVID (-). PMH: DM type 2 complicated by peripheral neuropathy, COVID 19 earlier in the year, pulmonary fibrosis, home O2 dependent, hypothyroidism,  Obesity, and recurrent falls. Family / Caregiver Present: No  Referring Practitioner: Dr. Lofton Book  Diagnosis: UTI    Subjective  Subjective: In bed on entry. Agreeable to therapy. Eager to participate in therapy. Does admit to fear of falling 2* h/o falls. \"Don't let me fall. \"  \"Now this walker doesn't feel as safe to me. It scares me.\" (uses 4 wh walker at home - using wh walker in hospital)  Pt tearful \"It feels like I'm starting all over again. \"    Patient Currently in Pain: L shoulder discomfort w/ quick movements - not rated, nurse aware      Social/Functional History  Social/Functional History  Lives With: Alone  Type of Home: Apartment ([facility])  Home Layout: One level  Home Access: Level entry, Elevator  Bathroom Shower/Tub: Tub/Shower unit (w/ cutout)  Bathroom Toilet: Standard (vanity next to toilet; 3in1 over toilet)  Bathroom Equipment: Grab bars in shower, Shower chair  Bathroom Accessibility: Accessible  Home Equipment: 4 wheeled walker, Alert Button, Reacher, Lift chair (has not had to use the Lift component on her lift chair)  ADL Assistance: [address]:  (meals delivered to room since Jase; laundry - assistance.)  Ambulation Assistance: Independent  Transfer Assistance: Independent  Active : No  Patient's  Info: daughters help w/ transportation prn; [facility] also have transportation you can schedule; daughters assist w/ obtaining groceries (orders online)  Additional Comments: Falls - 1 fall in February 2021 (broke a tooth); fall in April 2021 (broke hip/pelvis - no surgery). fall June 2021 (broke shoulder - no surgery). Using 4 wh walker regularly 2* frequent falls. (has neuropathy)       Objective   Vision: Within Functional Limits  Hearing: Within functional limits      Orientation  Overall Orientation Status: Within Functional Limits        Toilet Transfers  Toilet - Technique: Ambulating (using wh walker)  Equipment Used: Standard toilet (bar to L (side of limited shoulder ability))  Toilet Transfer: Minimal assistance  Toilet Transfers Comments: Note: pt fearful of std toilet transfer - 3in1 obtained for over toilet to simulate home environment     ADL  Feeding: Setup  LE Dressing: Moderate assistance (assist to thread feet into depends, CGA pullup in standing; assist to don/doff R sock)  Toileting: Contact guard assistance     Coordination  Movements Are Fluid And Coordinated: No  Coordination and Movement description: Decreased speed;Decreased accuracy; Right UE  Quality of Movement Other  Comment: tremors R hand (difficulty manipulating TV remote - needing assist to change channel); decreased finger strength for pushing buttons as well        Bed mobility  Supine to Sit: Moderate assistance (For trunk assist)  Scooting: Stand by assistance (Slow and effortful)     Transfers  Sit to stand: Minimal assistance  Stand to sit: Minimal assistance  Transfer Comments: Note: tendency to pull up on walker (has 4 wh walker at home and typically pulls up on walker); limited ability to pushup/reach back w/ LUE        Cognition  Cognition Comment: pleasant, cooperative, oriented, anxious w/ all activity, tearful at times           Sensation  Overall Sensation Status: Impaired (Intermittent N and T B feet)          LUE AROM (degrees)  LUE General AROM: ~20 degrees flexion, elbow to wrist WFL (recent shoulder fx - nonsurgical; reports recently being allowed to WB and range shoulder)  Right Hand AROM (degrees)  Right Hand AROM: WFL                  Pt seen by OT for eval and treat.  Treatment included: bed mobility, functional transfer/mobility, ADL, pt education         Plan   Plan  Times per week: 2-5  Times per day: Daily  Current Treatment Recommendations: Strengthening, ROM, Balance Training, Functional Mobility Training, Endurance Training, Safety Education & Training, Equipment Evaluation, Education, & procurement, Self-Care / ADL                                                      AM-PAC Score        AM-[facility] Inpatient Daily Activity Raw Score: 17 (10/09/21 0930)  AM-PAC Inpatient ADL T-Scale Score : 37.26 (10/09/21 0930)  ADL Inpatient CMS 0-100% Score: 50.11 (10/09/21 0930)  ADL Inpatient CMS G-Code Modifier : CK (10/09/21 0930)    Goals  Short term goals  Time Frame for Short term goals: Discharge  Short term goal 1: LB dressing w/ Min A  Short term goal 2: functional mobility to/from toilet, SBA  Short term goal 3: 3in1 transfer w/ SBA  Short term goal 4: supine to sit w/ SBA  Patient Goals   Patient goals : to regain independence       Therapy Time   Individual Concurrent Group Co-treatment   Time In 0818         Time Out 0927         Minutes 69              Timed Code Treatment Minutes:   54    Total Treatment Minutes:  [address] Merg[address] with [address] OTR/L #2012

## 2021-10-09 NOTE — PLAN OF CARE
Problem: Pain:  Goal: Pain level will decrease  Description: Pain level will decrease  Outcome: Ongoing  Note: Pt denies c/o pain. Will monitor. Problem: Falls - Risk of:  Goal: Will remain free from falls  Description: Will remain free from falls  Outcome: Ongoing  Note: Patient at risk for falls. Patient resting quietly in bed. Side rails up x 2/4. Bed locked in lowest position. Bed alarm on. Bedside table and call light within reach. Patient instructed to call for assistance. Patient verbalized understanding. Will continue to monitor.

## 2021-10-09 NOTE — ACP (ADVANCE CARE PLANNING)
ADVANCED CARE PLANNING    Name:Bhavani Fernandez       :  1945              MRN:  [id number]      Purpose of Encounter: Advanced care planning in light of recurrent admissions with falls. Parties in attendance: :Wali Granger MD  Decisional Capacity:Yes    Diagnosis: Active Problems:    UTI (urinary tract infection)    Acute metabolic encephalopathy  Resolved Problems:    * No resolved hospital problems. *    Patients Medical Story:72 y.o. female with a history of DM type 2 complicated by peripheral neuropathy, COVID 19 earlier in the year, pulmonary fibrosis, home O2 dependent, hypothyroidism,  Obesity, and recurrent falls  who presented to the emergency department  with left shoulder pain after fall.  She has  difficulty walking at baseline attributed to neuropathy. She got up to go to the bathroom and lost her balance and fell between dresser and bed. She is complaining of headache as well as increased pain to her left shoulder where she was diagnosed with a humerus fracture following a fall 3 months ago. She denies loss of consciousness. She admits to having been weak, and per squad patient was unable to stand.             Goals of Care Determinations: Patient wishes to focus on recovery from current acute medical condition and discharge to home/facility   Plan: Will notify Keshawn Espino MD of change in care plan. Will look at further interventions as needed. Code Status: At this time patient wishes to be Full Code   full code; but does not want to remain on \"any machine for long, and if it were a hopeless situation, just wants to be allowed to go. \"  Kayla Abreu, older daughter is POA    Time Spent with Patient: 19 minutes      Electronically signed by Wali Marques MD on 10/9/2021 at 12:48 PM  Thank you Keshawn Espino MD for the opportunity to be involved in this patient's care.

## 2021-10-09 NOTE — PROGRESS NOTES
Physical Therapy    Facility/Department: [address]  Initial Assessment and Treatment    NAME: Vasile Ambrocio  : 1945  MRN: [id number]    Date of Service: 10/9/2021    Discharge Recommendations:    Vasile Ambrocio scored a  17/24 on the AM-PAC short mobility form. Current research shows that an AM-PAC score of 17 or less is typically not associated with a discharge to the patient's home setting. Based on the patient's AM-PAC score and their current functional mobility deficits, it is recommended that the patient have 3-5 sessions per week of Physical Therapy at d/c to increase the patient's independence. Please see assessment section for further patient specific details. If patient discharges prior to next session this note will serve as a discharge summary. Please see below for the latest assessment towards goals. PT Equipment Recommendations  Equipment Needed: No    Assessment   Assessment: Pt is 69 yo F with decreased functional mobility. Pt with increased apprehension with all movement, and fearful of falling. Reassurance given. Pt requiring physical assistance of 1 person for activity. Pt will benefit from coninued inpt PT at d/c prior to return to [place] [id number]. Will follow. Treatment Diagnosis: Decreased functional mobility  Prognosis: Fair  Decision Making: Medium Complexity  PT Education: PT Role;General Safety  Patient Education: Pt verbalized understanding  REQUIRES PT FOLLOW UP: Yes  Activity Tolerance  Activity Tolerance: Patient limited by fatigue  Activity Tolerance: Pt limited by apprehension       Patient Diagnosis(es): The primary encounter diagnosis was Urinary tract infection without hematuria, site unspecified. Diagnoses of Diarrhea, unspecified type and Frequent falls were also pertinent to this visit.      has a past medical history of COVID-19, Diabetes mellitus ([facility] [address] Hyperlipidemia, Irritable bowel syndrome, On home oxygen therapy, Pulmonary fibrosis ([facility] [address]

## 2021-10-10 LAB
ANION GAP SERPL CALCULATED.3IONS-SCNC: 13 MMOL/L (ref 3–16)
BASOPHILS ABSOLUTE: 0 K/UL (ref 0–0.2)
BASOPHILS RELATIVE PERCENT: 0.7 %
BUN BLDV-MCNC: 15 MG/DL (ref 7–20)
CALCIUM SERPL-MCNC: 8.1 MG/DL (ref 8.3–10.6)
CHLORIDE BLD-SCNC: 91 MMOL/L (ref 99–110)
CO2: 29 MMOL/L (ref 21–32)
CREAT SERPL-MCNC: 0.9 MG/DL (ref 0.6–1.2)
EKG ATRIAL RATE: 64 BPM
EKG DIAGNOSIS: NORMAL
EKG P AXIS: 40 DEGREES
EKG P-R INTERVAL: 170 MS
EKG Q-T INTERVAL: 424 MS
EKG QRS DURATION: 70 MS
EKG QTC CALCULATION (BAZETT): 437 MS
EKG R AXIS: 1 DEGREES
EKG T AXIS: 30 DEGREES
EKG VENTRICULAR RATE: 64 BPM
EOSINOPHILS ABSOLUTE: 0.2 K/UL (ref 0–0.6)
EOSINOPHILS RELATIVE PERCENT: 2.9 %
GFR AFRICAN AMERICAN: >60
GFR NON-AFRICAN AMERICAN: >60
GLUCOSE BLD-MCNC: 87 MG/DL (ref 70–99)
HCT VFR BLD CALC: 36.8 % (ref 36–48)
HEMOGLOBIN: 12 G/DL (ref 12–16)
LYMPHOCYTES ABSOLUTE: 0.7 K/UL (ref 1–5.1)
LYMPHOCYTES RELATIVE PERCENT: 12.8 %
MCH RBC QN AUTO: 31.7 PG (ref 26–34)
MCHC RBC AUTO-ENTMCNC: 32.6 G/DL (ref 31–36)
MCV RBC AUTO: 97.1 FL (ref 80–100)
MONOCYTES ABSOLUTE: 0.8 K/UL (ref 0–1.3)
MONOCYTES RELATIVE PERCENT: 14.5 %
NEUTROPHILS ABSOLUTE: 3.6 K/UL (ref 1.7–7.7)
NEUTROPHILS RELATIVE PERCENT: 69.1 %
ORGANISM: ABNORMAL
PDW BLD-RTO: 20.7 % (ref 12.4–15.4)
PLATELET # BLD: 167 K/UL (ref 135–450)
PMV BLD AUTO: 7.6 FL (ref 5–10.5)
POTASSIUM SERPL-SCNC: 3.4 MMOL/L (ref 3.5–5.1)
RBC # BLD: 3.79 M/UL (ref 4–5.2)
ROTAVIRUS ANTIGEN: NEGATIVE
SODIUM BLD-SCNC: 133 MMOL/L (ref 136–145)
URINE CULTURE, ROUTINE: ABNORMAL
WBC # BLD: 5.2 K/UL (ref 4–11)

## 2021-10-10 PROCEDURE — 80048 BASIC METABOLIC PNL TOTAL CA: CPT

## 2021-10-10 PROCEDURE — 2580000003 HC RX 258: Performed by: INTERNAL MEDICINE

## 2021-10-10 PROCEDURE — 36415 COLL VENOUS BLD VENIPUNCTURE: CPT

## 2021-10-10 PROCEDURE — 6370000000 HC RX 637 (ALT 250 FOR IP): Performed by: INTERNAL MEDICINE

## 2021-10-10 PROCEDURE — 93010 ELECTROCARDIOGRAM REPORT: CPT | Performed by: INTERNAL MEDICINE

## 2021-10-10 PROCEDURE — 6360000002 HC RX W HCPCS: Performed by: INTERNAL MEDICINE

## 2021-10-10 PROCEDURE — 1200000000 HC SEMI PRIVATE

## 2021-10-10 PROCEDURE — 85025 COMPLETE CBC W/AUTO DIFF WBC: CPT

## 2021-10-10 RX ADMIN — CITALOPRAM HYDROBROMIDE 20 MG: 20 TABLET ORAL at 08:25

## 2021-10-10 RX ADMIN — LEVOFLOXACIN 750 MG: 750 INJECTION, SOLUTION INTRAVENOUS at 08:23

## 2021-10-10 RX ADMIN — CYANOCOBALAMIN 1000 MCG: 1000 INJECTION, SOLUTION INTRAMUSCULAR; SUBCUTANEOUS at 08:23

## 2021-10-10 RX ADMIN — ASPIRIN 81 MG: 81 TABLET, COATED ORAL at 08:25

## 2021-10-10 RX ADMIN — OXYCODONE 5 MG: 5 TABLET ORAL at 23:14

## 2021-10-10 RX ADMIN — ALPRAZOLAM 0.5 MG: 0.5 TABLET ORAL at 23:14

## 2021-10-10 RX ADMIN — Medication 10 ML: at 20:12

## 2021-10-10 RX ADMIN — FUROSEMIDE 40 MG: 40 TABLET ORAL at 08:25

## 2021-10-10 RX ADMIN — THIAMINE HCL TAB 100 MG 100 MG: 100 TAB at 08:25

## 2021-10-10 RX ADMIN — FERROUS SULFATE TAB 325 MG (65 MG ELEMENTAL FE) 325 MG: 325 (65 FE) TAB at 20:12

## 2021-10-10 RX ADMIN — ATORVASTATIN CALCIUM 10 MG: 20 TABLET, FILM COATED ORAL at 20:12

## 2021-10-10 RX ADMIN — THERA TABS 1 TABLET: TAB at 08:25

## 2021-10-10 RX ADMIN — PANTOPRAZOLE SODIUM 40 MG: 40 TABLET, DELAYED RELEASE ORAL at 08:25

## 2021-10-10 RX ADMIN — FERROUS SULFATE TAB 325 MG (65 MG ELEMENTAL FE) 325 MG: 325 (65 FE) TAB at 08:24

## 2021-10-10 RX ADMIN — OXYCODONE 5 MG: 5 TABLET ORAL at 11:13

## 2021-10-10 RX ADMIN — LEVOTHYROXINE SODIUM 137 MCG: 0.14 TABLET ORAL at 06:06

## 2021-10-10 RX ADMIN — ACETAMINOPHEN 650 MG: 325 TABLET ORAL at 04:36

## 2021-10-10 ASSESSMENT — PAIN SCALES - GENERAL
PAINLEVEL_OUTOF10: 0
PAINLEVEL_OUTOF10: 7
PAINLEVEL_OUTOF10: 7
PAINLEVEL_OUTOF10: 0
PAINLEVEL_OUTOF10: 6
PAINLEVEL_OUTOF10: 0
PAINLEVEL_OUTOF10: 3
PAINLEVEL_OUTOF10: 5

## 2021-10-10 ASSESSMENT — PAIN DESCRIPTION - DESCRIPTORS
DESCRIPTORS: ACHING

## 2021-10-10 ASSESSMENT — PAIN DESCRIPTION - LOCATION
LOCATION: SHOULDER
LOCATION: SHOULDER;ARM
LOCATION: SHOULDER

## 2021-10-10 ASSESSMENT — PAIN DESCRIPTION - PAIN TYPE
TYPE: CHRONIC PAIN
TYPE: CHRONIC PAIN
TYPE: ACUTE PAIN

## 2021-10-10 ASSESSMENT — PAIN DESCRIPTION - FREQUENCY
FREQUENCY: CONTINUOUS

## 2021-10-10 ASSESSMENT — PAIN DESCRIPTION - PROGRESSION
CLINICAL_PROGRESSION: NOT CHANGED
CLINICAL_PROGRESSION: GRADUALLY WORSENING
CLINICAL_PROGRESSION: GRADUALLY WORSENING

## 2021-10-10 ASSESSMENT — PAIN DESCRIPTION - ONSET
ONSET: ON-GOING

## 2021-10-10 ASSESSMENT — PAIN DESCRIPTION - ORIENTATION
ORIENTATION: LEFT

## 2021-10-10 ASSESSMENT — PAIN - FUNCTIONAL ASSESSMENT: PAIN_FUNCTIONAL_ASSESSMENT: PREVENTS OR INTERFERES WITH ALL ACTIVE AND SOME PASSIVE ACTIVITIES

## 2021-10-10 NOTE — PROGRESS NOTES
Alert and oriented patient states that she has taken pain medication with xanax before with no issues. Will monitor.

## 2021-10-10 NOTE — PLAN OF CARE
Problem: Pain:  Goal: Pain level will decrease  Description: Pain level will decrease  10/10/2021 1304 by Matthew Londono RN  Outcome: Ongoing  Note: Pt c/o L shoulder pain. Medicated with prn oxycodone per orders. Will continue to monitor. Problem: Falls - Risk of:  Goal: Will remain free from falls  Description: Will remain free from falls  10/10/2021 1304 by Matthew Londono RN  Outcome: Ongoing  Note: Patient at risk for falls. Patient resting quietly in bed. Side rails up x 2/4. Bed locked in lowest position. Bed alarm on. Bedside table and call light within reach. Patient instructed to call for assistance. Patient verbalized understanding. Will continue to monitor.

## 2021-10-10 NOTE — CONSULTS
Ortho consulted for L shoulder pain after repeat fall. Pt has a known L proximal humeral fracture from June 2021 being treated conservatively and followed by Dr Yisel Moyer. She reports new pain since this last fall. On exam, ROM limited due to pain. No pain to remainder of UE including elbow or wrist.     New shoulder films demonstrate no new fracture with exurberant bridging callous on all 4 cortices. No radiographic e/o dislocation or shoulder instability    Plan:  -no surgery indicated  -shoulder ROM as tolerated, PT/OT.   -pain control per internist  -patient should followup with current orthopaedic surgeon after discharge    Jer Garvey MD  Orthopaedic Surgery  [phone]

## 2021-10-10 NOTE — PROGRESS NOTES
Progress Note    Admit Date: 10/7/2021    Patient's PCP: Dr. Aga Saenz MD       Chief complaints: Weakness, falls      This is a very pleasant 68 y.o. female with a history of DM type 2 complicated by peripheral neuropathy, COVID 19 earlier in the year, pulmonary fibrosis, home O2 dependent, hypothyroidism,  Obesity, and recurrent falls  who presented to the emergency department  with left shoulder pain after fall. In the ED, she was afebrile. Vitals were satisfactory. Hgb was 11.4, down from 13.3 9/2021. BMP was WNL. CT a/P showed Cardiomegaly as well as Hepatomegaly and diffuse steatosis and other chronic changes including stable adrenal nodule and \"Mild retroperitoneal and iliac lymphadenopathy unchanged\". Interval HPI  No new complaints    Placed on CIWA as daughter says she has a drinking problem   Got 1 mg ativan overnight    Sitting OOB, eating lunch    No fever  No chest pain  Mental status appears improved        Medications : Reviewed      Allergies:  Lisinopril and Pcn [penicillins]        ROS: Review of Systems - Negative except as in HPI. .   All other systems reviewed and are negative.     PHYSICAL EXAM:  /70   Pulse 64   Temp 97.7 °F (36.5 °C) (Oral)   Resp 18   Ht 5' 8\" (1.727 m)   Wt 164 lb (74.4 kg)   SpO2 93%   Breastfeeding No   BMI 24.94 kg/m²     Recent Labs     10/08/21  1628 10/08/21  2035 10/09/21  0714 10/09/21  1149 10/09/21  1614   POCGLU 89 99 98 94 88       General appearance: alert, appears stated age and cooperative  Neck: no adenopathy, no carotid bruit, no JVD, supple, symmetrical, trachea midline and thyroid not enlarged, symmetric, no tenderness/mass/nodules  Lungs: clear to auscultation bilaterally  Heart: regular rate and rhythm, S1, S2 normal, no murmur, click, rub or gallop  Abdomen: soft, non-tender; bowel sounds normal; no masses,  no organomegaly  Extremities: limited ROM around left shoulder  Pulses: 2+ and symmetric  Skin: Skin color, texture, turgor normal. No rashes or lesions  Neurologic: Grossly normal    LABS:    Recent Labs     10/08/21  0023 10/09/21  0820 10/10/21  0748   WBC 4.5 4.7 5.2   HGB 11.4* 12.0 12.0   HCT 34.0* 36.8 36.8    189 167                                                                  Recent Labs     10/08/21  0023 10/09/21  0820 10/10/21  0748    136 133*   K 4.7 3.6 3.4*    93* 91*   CO2 26 28 29   BUN 11 8 15   CREATININE <0.5* 0.7 0.9   GLUCOSE 98 83 87     No results for input(s): AST, ALT, ALB, BILITOT, ALKPHOS in the last 72 hours. No results for input(s): TROPONINI in the last 72 hours. No results for input(s): BNP in the last 72 hours. No results found for: PHART, VJM4IQH, PO2ART  No results for input(s): INR in the last 72 hours. Recent Labs     10/08/21  0159   COLORU Yellow   PHUR 6.5   WBCUA *   RBCUA 11-20*   BACTERIA 4+*   CLARITYU Clear   SPECGRAV <=1.005   LEUKOCYTESUR LARGE*   UROBILINOGEN 0.2   BILIRUBINUR Negative   BLOODU MODERATE*   GLUCOSEU Negative          Assessment & Plan:     68 y.o. female who presented to the emergency department  with left shoulder pain after fall. Complicated UTI; Kleibsiella  - UTI with some mental status changes on admission  - Likely contributory to recurrent falls.   - Continue IV antibx--> PO antibx  - Follow final urine culture/sensitivity data  - Monitor for retention      Recurrent falls:   - Probably multifactorial: Underlying UTI, Vitamin B12 deficiency, with chronic benzodiazepines use, alcohol use disorder, neuropathy from DM, B12 decficiency and deconditioning from multiple recent ortho procedures/ chronic lung disease/Pulm fibrosis  - Updated TSH: WNL; Vitamin B12: low at 173; and hgb A1C: 4.8  - AM cortisol done with hx of adrenal nodule: 10.9  - Check NH3 level if mental status changes recur, john with finding of significant hepatic steatosis  - Replenish B12 parenterally  - Followed with Ortho at [facility] for Left shoulder proximal humerus fracture treated nonoperatively: Felt to be doing well. PT/OT advised. Repeated Xray noted  - Fall precautions  - Counseling re chronic benzo use.  - PT/OT eval: SNF      Left humerus fracture, possibly acute on chronic fracture  - Complained of persistent/recurrent left arm pain with loss of function left arm/shoulder  - Hx of falls, fall with comminuted and impacted fracture of the proximal left humerus about a month ago:  conservatively managed, sling/ then started PT/OT   - Followed with Dr. Lori Hu at [facility] about a month ago: Felt to be doing well. PT/OT advised with planned repeat Xray around 10/8/2021, around this time.  - Represented again with fall with increased pain  - Repeated Xray 10/9: \"Comminuted impacted fracture of the proximal left humerus.  This is unchanged in appearance from the prior study with new bone formation indicating subacute fracture, evaluate healing or acute on chronic fracture. \"  - Likely to impact her ability to participate in PT/OT, and predispose her to recurrent falls  - Orthopedics consult      ETOH abuse  - Family give hx of significant  ETOH abuse  - CIWA protocol  - Thiamin supplementation, MVI  - Social works eval      Vitamin B12 deficiency. POA  - Checked Vitamin B12, with hx of recurrent falls, neuropathy, and some cognitive issues on my eval: low at 173  - Started supplementation parenterally, in view of possible neuro effects with mental status; Neuropathy; recurrent falls       Acute anemia  - Drop in hgb from 13 to 11  - Hx of Anemia that was felt likely 2/2 recent GI blood loss   - Previous GI w/u : EGD on 6/13 at outside facility which indicated 2 colon polyps, internal hemorrhoids, high grade esophogeal stricture (chronic) and possible eosinophilic esophagitis, and severe diverticulosis  - Monitored hgb: back to 12, WNL. Acute anemia ruled out at this time.        Acute diahrea  - COVID testing sent in ED: Negative  - Send stool for C diff if watery stools  - Diahrea improved  - Lomotil PRN as/when appropriate      Chronic HF with preserved EF  - ECHO 2019: EF 55-60% with G2DD  - Fluid I/o; Dly weights      DM type 2 complicated by peripheral neuropathy  - Update A1C: 4.8  - Holding metformin with recent contrast  - DC'd  Insulin SS protocol  and monitoring BGs with AM labs: 80s-100s  - Encourage PO intake  - Liberalize diet for now and monitor      Pulmonary fibrosis  - COVID 19 earlier in the year  - Home O2 dependent, but appears not on O2 at rest.  - Uses O2 some times with activity and PRN       Hypothyroidism  - Updated TSH: WNL      Anxiety disorder  - Benzo dependent  - Counseling re chronic benzo use. The patient and / or the family were informed of the results of any tests, a time was given to answer questions, a plan was proposed and they agreed with plan. Code status: full code; but does not want to remain on \"any machine for long, and if it were a hopeless situation, just wants to be allowed to go. \"  Cynct Cramp, older daughter is POA      Full Code     Disposition:   Pxt is from [place], [facility]  PT/OT eval: SNF placement recommended, and will benefit, in view of recurrent falls. Orthopedic eval ordered for her left humerus fracture. Possibly SNF within 1-2 days if agreeable, pending ortho plans.      Villa Newell MD

## 2021-10-11 VITALS
BODY MASS INDEX: 24.86 KG/M2 | DIASTOLIC BLOOD PRESSURE: 62 MMHG | TEMPERATURE: 97.9 F | RESPIRATION RATE: 16 BRPM | HEART RATE: 58 BPM | WEIGHT: 164 LBS | SYSTOLIC BLOOD PRESSURE: 120 MMHG | OXYGEN SATURATION: 98 % | HEIGHT: 68 IN

## 2021-10-11 LAB
ANION GAP SERPL CALCULATED.3IONS-SCNC: 11 MMOL/L (ref 3–16)
BASOPHILS ABSOLUTE: 0 K/UL (ref 0–0.2)
BASOPHILS RELATIVE PERCENT: 0.7 %
BUN BLDV-MCNC: 17 MG/DL (ref 7–20)
CALCIUM SERPL-MCNC: 8 MG/DL (ref 8.3–10.6)
CHLORIDE BLD-SCNC: 92 MMOL/L (ref 99–110)
CO2: 30 MMOL/L (ref 21–32)
CREAT SERPL-MCNC: 1 MG/DL (ref 0.6–1.2)
EOSINOPHILS ABSOLUTE: 0.2 K/UL (ref 0–0.6)
EOSINOPHILS RELATIVE PERCENT: 4 %
GFR AFRICAN AMERICAN: >60
GFR NON-AFRICAN AMERICAN: 54
GLUCOSE BLD-MCNC: 89 MG/DL (ref 70–99)
HCT VFR BLD CALC: 34.3 % (ref 36–48)
HEMOGLOBIN: 11.4 G/DL (ref 12–16)
LYMPHOCYTES ABSOLUTE: 0.6 K/UL (ref 1–5.1)
LYMPHOCYTES RELATIVE PERCENT: 12.7 %
MCH RBC QN AUTO: 31.9 PG (ref 26–34)
MCHC RBC AUTO-ENTMCNC: 33.1 G/DL (ref 31–36)
MCV RBC AUTO: 96.3 FL (ref 80–100)
MONOCYTES ABSOLUTE: 0.8 K/UL (ref 0–1.3)
MONOCYTES RELATIVE PERCENT: 15.4 %
NEUTROPHILS ABSOLUTE: 3.4 K/UL (ref 1.7–7.7)
NEUTROPHILS RELATIVE PERCENT: 67.2 %
PDW BLD-RTO: 20.5 % (ref 12.4–15.4)
PLATELET # BLD: 174 K/UL (ref 135–450)
PMV BLD AUTO: 7.4 FL (ref 5–10.5)
POTASSIUM SERPL-SCNC: 3.6 MMOL/L (ref 3.5–5.1)
RBC # BLD: 3.56 M/UL (ref 4–5.2)
SODIUM BLD-SCNC: 133 MMOL/L (ref 136–145)
WBC # BLD: 5.1 K/UL (ref 4–11)

## 2021-10-11 PROCEDURE — 80048 BASIC METABOLIC PNL TOTAL CA: CPT

## 2021-10-11 PROCEDURE — 97530 THERAPEUTIC ACTIVITIES: CPT

## 2021-10-11 PROCEDURE — 36415 COLL VENOUS BLD VENIPUNCTURE: CPT

## 2021-10-11 PROCEDURE — 6370000000 HC RX 637 (ALT 250 FOR IP): Performed by: INTERNAL MEDICINE

## 2021-10-11 PROCEDURE — 97110 THERAPEUTIC EXERCISES: CPT

## 2021-10-11 PROCEDURE — 97116 GAIT TRAINING THERAPY: CPT

## 2021-10-11 PROCEDURE — 2580000003 HC RX 258: Performed by: INTERNAL MEDICINE

## 2021-10-11 PROCEDURE — 6360000002 HC RX W HCPCS: Performed by: INTERNAL MEDICINE

## 2021-10-11 PROCEDURE — 85025 COMPLETE CBC W/AUTO DIFF WBC: CPT

## 2021-10-11 RX ORDER — THIAMINE MONONITRATE (VIT B1) 100 MG
100 TABLET ORAL DAILY
Qty: 30 TABLET | Refills: 0
Start: 2021-10-12 | End: 2021-11-11

## 2021-10-11 RX ORDER — MULTIVITAMIN WITH IRON
1 TABLET ORAL DAILY
Refills: 0 | COMMUNITY
Start: 2021-10-12

## 2021-10-11 RX ORDER — OXYCODONE HYDROCHLORIDE 5 MG/1
2.5 TABLET ORAL EVERY 8 HOURS PRN
Qty: 5 TABLET | Refills: 0 | Status: SHIPPED | OUTPATIENT
Start: 2021-10-11 | End: 2021-10-14

## 2021-10-11 RX ORDER — ALPRAZOLAM 0.5 MG/1
0.5 TABLET ORAL NIGHTLY PRN
Qty: 3 TABLET | Refills: 0 | Status: SHIPPED | OUTPATIENT
Start: 2021-10-11 | End: 2021-10-14

## 2021-10-11 RX ORDER — LEVOFLOXACIN 750 MG/1
750 TABLET ORAL DAILY
Qty: 1 TABLET | Refills: 0
Start: 2021-10-12 | End: 2021-10-13

## 2021-10-11 RX ADMIN — ACETAMINOPHEN 650 MG: 325 TABLET ORAL at 03:16

## 2021-10-11 RX ADMIN — CITALOPRAM HYDROBROMIDE 20 MG: 20 TABLET ORAL at 08:31

## 2021-10-11 RX ADMIN — ASPIRIN 81 MG: 81 TABLET, COATED ORAL at 08:31

## 2021-10-11 RX ADMIN — THERA TABS 1 TABLET: TAB at 08:31

## 2021-10-11 RX ADMIN — FERROUS SULFATE TAB 325 MG (65 MG ELEMENTAL FE) 325 MG: 325 (65 FE) TAB at 08:31

## 2021-10-11 RX ADMIN — FUROSEMIDE 40 MG: 40 TABLET ORAL at 08:31

## 2021-10-11 RX ADMIN — LEVOFLOXACIN 750 MG: 750 INJECTION, SOLUTION INTRAVENOUS at 08:38

## 2021-10-11 RX ADMIN — CYANOCOBALAMIN 1000 MCG: 1000 INJECTION, SOLUTION INTRAMUSCULAR; SUBCUTANEOUS at 08:30

## 2021-10-11 RX ADMIN — LORAZEPAM 1 MG: 1 TABLET ORAL at 14:29

## 2021-10-11 RX ADMIN — PANTOPRAZOLE SODIUM 40 MG: 40 TABLET, DELAYED RELEASE ORAL at 08:31

## 2021-10-11 RX ADMIN — LEVOTHYROXINE SODIUM 137 MCG: 0.14 TABLET ORAL at 05:59

## 2021-10-11 RX ADMIN — Medication 10 ML: at 08:32

## 2021-10-11 RX ADMIN — OXYCODONE 5 MG: 5 TABLET ORAL at 14:27

## 2021-10-11 RX ADMIN — THIAMINE HCL TAB 100 MG 100 MG: 100 TAB at 08:31

## 2021-10-11 ASSESSMENT — PAIN SCALES - GENERAL
PAINLEVEL_OUTOF10: 4
PAINLEVEL_OUTOF10: 3
PAINLEVEL_OUTOF10: 0
PAINLEVEL_OUTOF10: 0
PAINLEVEL_OUTOF10: 7

## 2021-10-11 ASSESSMENT — PAIN DESCRIPTION - ONSET: ONSET: GRADUAL

## 2021-10-11 ASSESSMENT — PAIN DESCRIPTION - LOCATION: LOCATION: SHOULDER

## 2021-10-11 ASSESSMENT — PAIN DESCRIPTION - FREQUENCY: FREQUENCY: CONTINUOUS

## 2021-10-11 ASSESSMENT — PAIN DESCRIPTION - DESCRIPTORS: DESCRIPTORS: ACHING

## 2021-10-11 ASSESSMENT — PAIN DESCRIPTION - PROGRESSION: CLINICAL_PROGRESSION: GRADUALLY WORSENING

## 2021-10-11 ASSESSMENT — PAIN - FUNCTIONAL ASSESSMENT: PAIN_FUNCTIONAL_ASSESSMENT: PREVENTS OR INTERFERES WITH ALL ACTIVE AND SOME PASSIVE ACTIVITIES

## 2021-10-11 ASSESSMENT — PAIN DESCRIPTION - PAIN TYPE: TYPE: ACUTE PAIN

## 2021-10-11 NOTE — DISCHARGE SUMMARY
Hospital Medicine Discharge Summary    Patient: Claudeen Gros     Gender: female  : 1945   Age: 68 y.o. MRN: [id number]    Admitting Physician: Qasim Sharpe DO  Discharge Physician: Shelly Sanders DO    Code Status: Full Code     Admit Date: 10/7/2021   Discharge Date: 10/11/2021      Disposition: SNF    Discharge Diagnoses: Active Hospital Problems    Diagnosis Date Noted    UTI (urinary tract infection) [N39.0] 10/08/2021    Acute metabolic encephalopathy [F37.00] 10/08/2021       Condition at Discharge: [facility] Course:     68 y.o. female who presented to the emergency department  with left shoulder pain after fall.    Complicated UTI; Kleibsiella  - UTI with some mental status changes on admission  - Likely contributory to recurrent falls. - Continue IV antibx--> PO antibx     Recurrent falls:   - Probably multifactorial: Underlying UTI, Vitamin B12 deficiency, with chronic benzodiazepines use, alcohol use disorder, neuropathy from DM, B12 decficiency and deconditioning from multiple recent ortho procedures/ chronic lung disease/Pulm fibrosis  - Updated TSH: WNL; Vitamin B12: low at 173; and hgb A1C: 4.8  - AM cortisol done with hx of adrenal nodule: 10.9  - Replenish B12 parenterally     Left humerus fracture, possibly acute on chronic fracture  - Orthopedics consulted, continue non-operative management and follow-up outpatient      ETOH abuse  - Family give hx of significant  ETOH abuse  - CIWA protocol - needing occasional 1 mg seems for chronic anxiety  - Thiamin supplementation, MVI  - Social works eval     Pulmonary fibrosis  - COVID 19 earlier in the year  - Home O2 dependent, but appears not on O2 at rest.  - Uses O2 some times with activity and PRN      Hypothyroidism  - Updated TSH: WNL     Anxiety disorder  - Benzo dependent  - Counseling re chronic benzo use.       Additional findings or notes to primary provider:  None at this time    Discharge Medications:   Current Discharge Medication List      START taking these medications    Details   oxyCODONE (ROXICODONE) 5 MG immediate release tablet Take 0.5 tablets by mouth every 8 hours as needed for Pain for up to 3 days. Qty: 5 tablet, Refills: 0    Comments: Reduce doses taken as pain becomes manageable  Associated Diagnoses: Fall, initial encounter      levoFLOXacin (LEVAQUIN) 750 MG tablet Take 1 tablet by mouth daily for 1 day  Qty: 1 tablet, Refills: 0      Multiple Vitamin (MULTIVITAMIN) TABS tablet Take 1 tablet by mouth daily  Refills: 0      thiamine mononitrate (THIAMINE) 100 MG tablet Take 1 tablet by mouth daily  Qty: 30 tablet, Refills: 0           Current Discharge Medication List      CONTINUE these medications which have CHANGED    Details   ALPRAZolam (XANAX) 0.5 MG tablet Take 1 tablet by mouth nightly as needed for Sleep or Anxiety for up to 3 days. Qty: 3 tablet, Refills: 0    Associated Diagnoses: Benzodiazepine dependence ([address] Anxiety      metFORMIN (GLUCOPHAGE) 1000 MG tablet Take 1 tablet by mouth 2 times daily (with meals) Hold for now and follow-up with primary provider for further management.   Qty: 60 tablet, Refills: 3           Current Discharge Medication List      CONTINUE these medications which have NOT CHANGED    Details   dicyclomine (BENTYL) 20 MG tablet Take 20 mg by mouth every 4-6 hours as needed      citalopram (CELEXA) 20 MG tablet Take 20 mg by mouth daily      levothyroxine (SYNTHROID) 137 MCG tablet Take 137 mcg by mouth Daily      pantoprazole (PROTONIX) 40 MG tablet Take 40 mg by mouth daily      simvastatin (ZOCOR) 20 MG tablet Take 20 mg by mouth nightly      Ergocalciferol (VITAMIN D2 PO) Take 50,000 Units by mouth once a week      furosemide (LASIX) 40 MG tablet Take 1 tablet by mouth daily  Qty: 60 tablet, Refills: 3      ferrous sulfate 325 (65 Fe) MG tablet Take 1 tablet by mouth 2 times daily  Qty: 180 tablet, Refills: 3      aspirin 81 MG tablet Take 81 mg by mouth daily Current Discharge Medication List          Discharge ROS:  A complete review of systems was asked and negative. Discharge Exam:    /62   Pulse 64   Temp 97.5 °F (36.4 °C) (Oral)   Resp 18   Ht 5' 8\" (1.727 m)   Wt 164 lb (74.4 kg)   SpO2 92%   Breastfeeding No   BMI 24.94 kg/m²   General appearance:  NAD  HEENT:   Normal cephalic, atraumatic, moist mucous membranes, no oropharyngeal erythema or exudate  Neck: Supple, trachea midline, no anterior cervical or SC LAD  Heart[de-identified] Normal s1/s2, RRR, no murmurs, gallops, or rubs. No leg edema  Lungs:  Clear to auscultation bilaterally, no wheeze, rales or rhonchi, no use of accessory musclesNormal respiratory effort. Clear to auscultation, bilaterally without Rales/Wheezes/Rhonchi. Abdomen: Soft, non-tender, non-distended, bowel sounds present, no masses  Musculoskeletal:  No clubbing, no cyanosis, or edema  Skin: No lesion or masses  Neurologic:  Neurovascularly intact without any focal sensory/motor deficits. Cranial nerves: II-XII intact, grossly non-focal.  Psychiatric:  Alert and oriented, thought content appropriate    Labs: For convenience and continuity at follow-up the following most recent labs are provided:    Lab Results   Component Value Date    WBC 5.1 10/11/2021    HGB 11.4 10/11/2021    HCT 34.3 10/11/2021    MCV 96.3 10/11/2021     10/11/2021     10/11/2021    K 3.6 10/11/2021    K 4.7 10/08/2021    CL 92 10/11/2021    CO2 30 10/11/2021    BUN 17 10/11/2021    CREATININE 1.0 10/11/2021    CALCIUM 8.0 10/11/2021     No results found for: INR    Radiology:    CT ABDOMEN PELVIS W IV CONTRAST Additional Contrast? None    Result Date: 10/8/2021  CT ABDOMEN PELVIS W IV CONTRAST Indication: Left upper quadrant pain left lower quadrant pain. History of diverticulitis.  Comparison: CT abdomen pelvis 9/20/21 Technique:  CT of the abdomen and pelvis was obtained after the administration of intravenous contrast. Coronal and sagittal reformations performed. Up-to-date CT equipment and radiation dose reduction techniques were employed. 80 mL Isovue-370 Findings: Lung bases: Mild subpleural reticulation. Cardiomegaly. Liver: Hepatomegaly and severe diffuse hepatic steatosis. Gallbladder and biliary tree: Cholelithiasis. Gallbladder is nondistended. No ductal dilatation. Pancreas: Normal. Spleen: Normal. Adrenals: Stable right adrenal nodule 2 cm. Kidneys: Symmetric enhancement. No hydronephrosis. Bladder: Normal. Reproductive organs: No associated mass. Bowel: Normal caliber. No obstruction. Appendix not seen. No secondary findings of appendicitis. Severe diverticulosis in the sigmoid colon. No evidence of acute diverticulitis. Lymph nodes: Scattered borderline and mildly enlarged lymph nodes in the retroperitoneum and bilateral iliac regions. Peritoneum: No free air or free fluid. Mild hazy infiltration in the omental fat is nonspecific but unchanged. Vessels: Diffuse atherosclerosis. Normal caliber aorta. Major vessels are patent. Abdominal wall: Nonspecific subcutaneous infiltration. Bones: No destructive lesions. Right total hip arthroplasty. Severe degenerative changes in the lumbar spine and dextro scoliosis. 1.  Severe diverticulosis in the sigmoid colon. No evidence of acute diverticulitis. 2.  Cholelithiasis. 3.  Stable right adrenal nodule. 4.  Mild retroperitoneal and iliac lymphadenopathy unchanged. 5.  Hepatomegaly and diffuse steatosis. 6.  Cardiomegaly. XR SHOULDER LEFT (MIN 2 VIEWS)    Result Date: 10/9/2021  2 VIEWS OF THE LEFT SHOULDER Comparison exam 9/20/2021 HISTORY: Follow-up left humerus fracture FINDINGS: Redemonstrated is a comminuted impacted fractures of the proximal left humerus. There is new bone formation indicating either a subacute fracture or incompletely healed acute on chronic fracture.   There is been no change in appearance or alignment the prior exam.  Multiple loose bodies suspected within the shoulder joint.  AC joint is intact with mild AC arthropathy. 1.  Comminuted impacted fracture of the proximal left humerus. This is unchanged in appearance from the prior study with new bone formation indicating subacute fracture, evaluate healing or acute on chronic fracture. Overall no significant change in appearance from the prior study. CT CHEST ABDOMEN PELVIS W CONTRAST    Result Date: 9/20/2021  Patient: Nabil Rodriguez  Time Out: 06:09 Exam(s): CT ABDOMEN + PELVIS With Contrast, CT CHEST With Contrast  EXAM:   CT Abdomen and Pelvis With Intravenous Contrast  CLINICAL HISTORY:   fall, chest pain. TECHNIQUE:   Axial computed tomography images of the abdomen and pelvis with intravenous contrast.  CTDI is 22.03 mGy and DLP is 1562.29 mGy-cm. All CT scans at this facility use dose modulation, iterative reconstruction, and/or weight based dosing when appropriate to reduce radiation dose to as low as reasonably achievable. COMPARISON:   No relevant prior studies available. FINDINGS:   Lung bases:  Unremarkable. No mass. No consolidation. ABDOMEN:   Liver:  Unremarkable. No mass. Gallbladder and bile ducts:  Cholelithiasis. No ductal dilation. Pancreas:  Unremarkable. No mass. No ductal dilation. Spleen:  Unremarkable. No splenomegaly. Adrenals:  2 cm hypoenhancing nodule of right adrenal gland, measuring about 45 Hounsfield units. Kidneys and ureters:  Unremarkable. No solid mass. No hydronephrosis. Stomach and bowel: Moderate colonic diverticulosis. 4.2 cm diverticulum in the aspect of second portion the duodenum. No obstruction. No mucosal thickening. PELVIS:   Appendix:  No findings to suggest acute appendicitis. Bladder:  Unremarkable. No mass. Reproductive:  Likely subserosal fibroid in the posterior fundus of anteverted uterus measuring about 2.2 cm on series 603 image 87. ABDOMEN and PELVIS:   Intraperitoneal space:  Unremarkable. No free air.   No significant fluid collection. Bones/joints:  Osteopenia. Moderate degenerative changes. Moderate upper lumbar scoliosis convexed to the right. Total right hip replacement prostheses cause large amount of streak artifact, which decreases the sensitivity on associated images. Old fracture of right infrapubic ramus. .   Soft tissues:  Mild anasarca. Vasculature:  Unremarkable. No abdominal aortic aneurysm. Lymph nodes:  Unremarkable. No enlarged lymph nodes. EXAM:   CT Chest With Intravenous Contrast  CLINICAL HISTORY:   fall, chest pain. TECHNIQUE:   Axial computed tomography images of the chest with intravenous contrast.   CTDI is 22.03 mGy and DLP is 1562.29 mGy-cm. All CT scans at this facility use dose modulation, iterative reconstruction, and/or weight based dosing when appropriate to reduce radiation dose to as low as reasonably achievable. COMPARISON:   No relevant prior studies available. FINDINGS:   Lungs:  Small amount of peripheral atelectasis and less likely pneumonia or aspiration, slightly more on the left. Pleural space:  Unremarkable. No pneumothorax. No significant effusion. Heart:  Mild cardiomegaly. No significant pericardial effusion. Bones/joints:  Nonacute fracture of left humeral neck, with 3.6 cm shortening and malunion. Osteopenia. Moderate degenerative changes. Moderate upper thoracic scoliosis convexed to the left. Old left rib fractures. Soft tissues:  Unremarkable. Vasculature:  Unremarkable. No thoracic aortic aneurysm. Lymph nodes:  Unremarkable. No enlarged lymph nodes. Electronically signed by Hilary Berumen M.D. on 09-20-21 at [id number]    1. Mild anasarca. 2.  Cholelithiasis. 3.  2 cm hypoenhancing nodule of right adrenal gland 4. No acute fracture or visceral organ injury. _______________________________________________  IMPRESSION:       No pneumothorax, aortic dissection, or acute fracture.               The patient was seen and examined on day of discharge and this discharge summary is in conjunction with any daily progress note from day of discharge. Time Spent on discharge is more than 30 minutes in the examination, evaluation, counseling and review of medications and discharge plan. Ky Anne DO   10/11/2021      Thank you Rafaela Li MD for the opportunity to be involved in this patient's care. If you have any questions or concerns please feel free to contact me at [facility].

## 2021-10-11 NOTE — PROGRESS NOTES
Progress Note    Admit Date: 10/7/2021    Patient's PCP: Dr. Migel Cunningham MD       Chief complaints: Weakness, falls      This is a very pleasant 68 y.o. female with a history of DM type 2 complicated by peripheral neuropathy, COVID 19 earlier in the year, pulmonary fibrosis, home O2 dependent, hypothyroidism,  Obesity, and recurrent falls  who presented to the emergency department  with left shoulder pain after fall. In the ED, she was afebrile. Vitals were satisfactory. Hgb was 11.4, down from 13.3 9/2021. BMP was WNL. CT a/P showed Cardiomegaly as well as Hepatomegaly and diffuse steatosis and other chronic changes including stable adrenal nodule and \"Mild retroperitoneal and iliac lymphadenopathy unchanged\". Interval HPI  Seen working with OT. No new complaints since yesterday however she does report anxiety and a long history of this. Medications : Reviewed      Allergies:  Lisinopril and Pcn [penicillins]    ROS: Review of Systems - Negative except as in HPI. .   All other systems reviewed and are negative.     PHYSICAL EXAM:  /62   Pulse 64   Temp 97.5 °F (36.4 °C) (Oral)   Resp 18   Ht 5' 8\" (1.727 m)   Wt 164 lb (74.4 kg)   SpO2 92%   Breastfeeding No   BMI 24.94 kg/m²     Recent Labs     10/08/21  1628 10/08/21  2035 10/09/21  0714 10/09/21  1149 10/09/21  1614   POCGLU 89 99 98 94 88       General appearance: alert, appears stated age and cooperative  Neck: no adenopathy, no carotid bruit, no JVD, supple, symmetrical, trachea midline and thyroid not enlarged, symmetric, no tenderness/mass/nodules  Lungs: clear to auscultation bilaterally  Heart: regular rate and rhythm, S1, S2 normal, no murmur, click, rub or gallop  Abdomen: soft, non-tender; bowel sounds normal; no masses,  no organomegaly  Extremities: limited ROM around left shoulder  Pulses: 2+ and symmetric  Skin: Skin color, texture, turgor normal. No rashes or lesions  Neurologic: Grossly normal    LABS:    Recent humerus fracture, possibly acute on chronic fracture  - Complained of persistent/recurrent left arm pain with loss of function left arm/shoulder  - Hx of falls, fall with comminuted and impacted fracture of the proximal left humerus about a month ago:  conservatively managed, sling/ then started PT/OT   - Followed with Dr. Ayden Noble at [facility] about a month ago: Felt to be doing well. PT/OT advised with planned repeat Xray around 10/8/2021, around this time.  - Represented again with fall with increased pain  - Repeated Xray 10/9: \"Comminuted impacted fracture of the proximal left humerus. This is unchanged in appearance from the prior study with new bone formation indicating subacute fracture, evaluate healing or acute on chronic fracture. \"  - Likely to impact her ability to participate in PT/OT, and predispose her to recurrent falls  - Orthopedics consulted, continue non-operative management and follow-up outpatient     ETOH abuse  - Family give hx of significant  ETOH abuse  - CIWA protocol - needing occasional 1 mg seems for chronic anxiety  - Thiamin supplementation, MVI  - Social works eval    Vitamin B12 deficiency. POA  - Checked Vitamin B12, with hx of recurrent falls, neuropathy, and some cognitive issues on my eval: low at 173  - Started supplementation parenterally, in view of possible neuro effects with mental status; Neuropathy; recurrent falls     Acute anemia  - Drop in hgb from 13 to 11  - Hx of Anemia that was felt likely 2/2 recent GI blood loss   - Previous GI w/u : EGD on 6/13 at outside facility which indicated 2 colon polyps, internal hemorrhoids, high grade esophogeal stricture (chronic) and possible eosinophilic esophagitis, and severe diverticulosis  - Monitored hgb: back to 12, WNL. Acute anemia ruled out at this time.      Acute diahrea  - COVID testing sent in ED: Negative  - Send stool for C diff if watery stools  - Diahrea improved  - Lomotil PRN as/when appropriate    Chronic HF with preserved EF  - ECHO 2019: EF 55-60% with G2DD  - Fluid I/o; Dly weights    DM type 2 complicated by peripheral neuropathy  - Update A1C: 4.8  - Holding metformin with recent contrast  - DC'd  Insulin SS protocol  and monitoring BGs with AM labs: 80s-100s  - Encourage PO intake  - Liberalize diet for now and monitor    Pulmonary fibrosis  - COVID 19 earlier in the year  - Home O2 dependent, but appears not on O2 at rest.  - Uses O2 some times with activity and PRN     Hypothyroidism  - Updated TSH: WNL    Anxiety disorder  - Benzo dependent  - Counseling re chronic benzo use. Code status: \"full code; but does not want to remain on \"any machine for long, and if it were a hopeless situation, just wants to be allowed to go. \"  Enriqueta Teixeira, older daughter is POA\"    Full Code     Disposition:   Pxt is from [place]  ok to dc to SNF.      Mario Alberto Hall, DO

## 2021-10-11 NOTE — CARE COORDINATION
Case Management Assessment            Discharge Note                    Date / Time of Note: 10/11/2021 1:06 PM                  Discharge Note Completed by: ALLY Oneill, LSW    Patient Name: Jesús Snow   YOB: 1945  Diagnosis: UTI (urinary tract infection) [N39.0]  Frequent falls [R29.6]  Urinary tract infection without hematuria, site unspecified [N39.0]  Diarrhea, unspecified type [X06.4]  Acute metabolic encephalopathy [W16.09]   Date / Time: 10/7/2021 11:16 PM    Current PCP: Frankey Slot, MD  Clinic patient: No    Hospitalization in the last 30 days: No    Advance Directives:  Code Status: Full Code  [place] DNR form completed and on chart: No    Financial:  Payor: MEDICARE / Plan: MEDICARE PART A AND B / Product Type: *No Product type* /      Pharmacy:    [place], [place] - [address] [facility] [phone] [address]  Phone: [phone] Fax: [phone]      Assistance purchasing medications?:    Assistance provided by Case Management: None at this time    Does patient want to participate in local refill/ meds to beds program?:      Meds To Beds General Rules:  1. Can ONLY be done Monday- Friday between 8:30am-5pm  2. Prescription(s) must be in pharmacy by 3pm to be filled same day  3. Copy of patient's insurance/ prescription drug card and patient face sheet must be sent along with the prescription(s)  4. Cost of Rx cannot be added to hospital bill. If financial assistance is needed, please contact unit  or ;  or  CANNOT provide pharmacy voucher for patients co-pays  5.  Patients can then  the prescription on their way out of the hospital at discharge, or pharmacy can deliver to the bedside if staff is available. (payment due at time of pick-up or delivery - cash, check, or card accepted)     Able to afford home medications/ co-pay costs: Yes    ADLS:  Current PT AM-PAC Score: 17 /24  Current OT AM-PAC Score: 17 /24      DISCHARGE Disposition: [place] (SNF): [facility] [place] Phone: [phone] Fax: [phone]    LOC at discharge: Skilled  Kirti Santoro Completed: Yes    Notification completed in HENS/PAS?:  Not Applicable    IMM Completed:   Not Indicated    Transportation:  Transportation PLAN for discharge: EMS transportation   Mode of Transport: Ambulance stretcher - BLS  Reason for medical transport: Bed confined: Meets the following criteria 1) unable to get out of bed without assistance or ambulate, 2) unable to safely sit up in a wheelchair, 3) unable to maintain erect seating position in a chair for time needed for transport  Name of Transport Company: [email or web address]  Phone: [phone]  Time of Transport: 5:00    Transport form completed: Yes    Home Care:  [address] ordered at discharge: No  [address]: Not Applicable  Orders faxed: No    Durable Medical Equipment:  DME Provider: None  Equipment obtained during hospitalization:     Home Oxygen and Respiratory Equipment:  Oxygen needed at discharge?: No  [address]: Not Applicable  Portable tank available for discharge?: Not Indicated    Dialysis:  Dialysis patient: No    Dialysis Center:  Not Applicable    Additional CM Notes: Pt from [facility] [place], Pt to [place] to [facility] spoke richardson/Janene [phone] and confirmed start of care, [facility] at [place].      The Plan for Transition of Care is related to the following treatment goals of UTI (urinary tract infection) [N39.0]  Frequent falls [R29.6]  Urinary tract infection without hematuria, site unspecified [N39.0]  Diarrhea, unspecified type [W47.5]  Acute metabolic encephalopathy [F65.23]    The Patient and/or patient representative Bree Pate and her family were provided with a choice of provider and agrees with the discharge plan Yes    Freedom of choice list was provided with basic dialogue that supports the patient's individualized plan of care/goals and shares the quality data associated with the providers.  Yes    Care Transitions patient: No    ALLY Rosa, [facility]  Case Management Department  Ph: [phone]  Fax: [phone]

## 2021-10-11 NOTE — CARE COORDINATION
SW Following, Pt from  [address] [address], SW spoke w/Janene [phone] At [facility] and Pt is able to DC to [facility] LOC, SW provided substance abuse resources in Pt's AVS, Pt is being assessed for a Left Humerus fx, SW will continue to follow for DC needs/recs.     Electronically signed by ALLY Palomares LSW on 10/11/2021 at 11:37 AM   [phone]

## 2021-10-11 NOTE — PROGRESS NOTES
Occupational Therapy   Occupational Therapy Initial Assessment  Date: 10/11/2021   Patient Name: Vasile Ambrocio  MRN: [id number]     : 1945    Date of Service: 10/11/2021    Discharge Recommendations:    Vasile Ambrocio scored a 17/24 on the AM-PAC ADL Inpatient form. Current research shows that an AM-PAC score of 17 or less is typically not associated with a discharge to the patient's home setting. Based on the patient's AM-PAC score and their current ADL deficits, it is recommended that the patient have 3-5 sessions per week of Occupational Therapy at d/c to increase the patient's independence. Please see assessment section for further patient specific details. If patient discharges prior to next session this note will serve as a discharge summary. Please see below for the latest assessment towards goals. Assessment   Performance deficits / Impairments: Decreased functional mobility ; Decreased ADL status; Decreased endurance  Assessment: Pt demos improvement requiring min A for supine to sit this date. pt continues to require min A for sit to stand and CGA for mobility with RW. Pt demos fear of falling and increased anxiety during session pt felt \"overwhelmed\" following therapy and \"clausterphobic\" with table too close. Pt would benefit from continued IP OT following DC to increase confidence and safety with ADLs and functional mobility before going home alone. Cont OT POC. Treatment Diagnosis: impaired ADLs/transfers s/p fall  OT Education: Plan of Care;OT Role;Transfer Training;ADL Adaptive Strategies; Equipment  Patient Education: pt educated on POC and current performance, proper technique for bed mobility and deep breathing techniques  Barriers to Learning: anxiety  Activity Tolerance  Activity Tolerance: Patient Tolerated treatment well  Activity Tolerance: limited by anxiety  Safety Devices  Safety Devices in place: Yes  Type of devices: Nurse notified; Left in chair;Chair alarm in place;Call light within reach           Patient Diagnosis(es): The primary encounter diagnosis was Benzodiazepine dependence ([facility] [address] Diagnoses of Urinary tract infection without hematuria, site unspecified, Diarrhea, unspecified type, Frequent falls, Fall, initial encounter, and Anxiety were also pertinent to this visit. has a past medical history of COVID-19, Diabetes mellitus ([facility] [address] Hyperlipidemia, Irritable bowel syndrome, On home oxygen therapy, Pulmonary fibrosis ([facility] [address] Thyroid disease, and UTI (urinary tract infection). has a past surgical history that includes Upper gastrointestinal endoscopy and Colonoscopy. Treatment Diagnosis: impaired ADLs/transfers s/p fall      Restrictions  Position Activity Restriction  Other position/activity restrictions: Up with assist, seizure precautions    Subjective   General  Chart Reviewed: Yes  Additional Pertinent Hx: 68 y.o. to ED 10/8 w/ c/o left shoulder pain after fall. Hospital Course: CT Abd/Pelvis: Severe diverticulosis in the sigmoid colon; COVID (-). PMH: DM type 2 complicated by peripheral neuropathy, COVID 19 earlier in the year, pulmonary fibrosis, home O2 dependent, hypothyroidism,  Obesity, and recurrent falls. Family / Caregiver Present: No  Referring Practitioner: Dr. Naranjo Shock  Diagnosis: UTI  Subjective  Subjective: Pt in bed upon arrival, agreeable to therapy. pt anxious throughout session limiting attention and focus. Pt reports \"feeling down and discouraged\" following functional mobility because it seeemed more difficult than earlier in the day, requesting Xanax from nurse at conclusion of session.   Patient Currently in Pain: Denies  Vital Signs  Patient Currently in Pain: Denies  Social/Functional History  Social/Functional History  Lives With: Alone  Type of Home: Apartment ([facility])  Home Layout: One level  Home Access: Level entry, Elevator  Bathroom Shower/Tub: Tub/Shower unit (w/ cutout)  Bathroom Toilet: Standard (vanity next to toilet; 3in1 over toilet)  Bathroom Equipment: Grab bars in shower, Shower chair  Bathroom Accessibility: Accessible  Home Equipment: 4 wheeled walker, Alert Button, Reacher, Lift chair (has not had to use the Lift component on her lift chair)  ADL Assistance: [place]:  (meals delivered to room since [facility]; laundry - assistance.)  Ambulation Assistance: Independent  Transfer Assistance: Independent  Active : No  Patient's  Info: daughters help w/ transportation prn; [facility] also have transportation you can schedule; daughters assist w/ obtaining groceries (orders online)  Additional Comments: Falls - 1 fall in February 2021 (broke a tooth); fall in April 2021 (broke hip/pelvis - no surgery). fall June 2021 (broke shoulder - no surgery). Using 4 wh walker regularly 2* frequent falls. (has neuropathy)       Objective        Orientation  Overall Orientation Status: Within Functional Limits     Balance  Sitting Balance: Supervision (EOB for ~10 minutes. Pt completed 10 core twists and 10 forward crunches for increased core strength needed for bed mobility.)  Standing Balance: Contact guard assistance (CGA for balance. ~2 minutes during functional transfers/ambulation)  Functional Mobility  Functional - Mobility Device: Rolling Walker  Activity: Other (to recliner)  Assist Level: Contact guard assistance  Functional Mobility Comments: pt reports neuropothy in B feet. Reports mild dizziness and feeling unsteady with RW.           Bed mobility  Supine to Sit: Minimal assistance (HOB raised ~15 degrees, rails down. VCs for log roll technique. Min A for trunk management.  slow and effortful)  Scooting: Stand by assistance  Transfers  Sit to stand: Minimal assistance  Stand to sit: Minimal assistance  Transfer Comments: Note: tendency to pull up on walker (has 4 wh walker at home and typically pulls up on walker); limited ability to pushup/reach back w/ LUE     10/11 cont to require VCs to push from surface vs. pull on walker                                              Plan   Plan  Times per week: 2-5  Times per day: Daily  Current Treatment Recommendations: Strengthening, ROM, Balance Training, Functional Mobility Training, Endurance Training, Safety Education & Training, Equipment Evaluation, Education, & procurement, Self-Care / ADL                                                      AM-PAC Score        AM-PAC Inpatient Daily Activity Raw Score: 17 (10/11/21 1333)  AM-PAC Inpatient ADL T-Scale Score : 37.26 (10/11/21 1333)  ADL Inpatient CMS 0-100% Score: 50.11 (10/11/21 1333)  ADL Inpatient CMS G-Code Modifier : CK (10/11/21 1333)    Goals  Short term goals  Time Frame for Short term goals: Discharge  Short term goal 1: LB dressing w/ Min A  Short term goal 2: functional mobility to/from toilet, SBA  Short term goal 3: 3in1 transfer w/ SBA  Short term goal 4: supine to sit w/ SBA- progressing min A 10/11  Patient Goals   Patient goals : to regain independence       Therapy Time   Individual Concurrent Group Co-treatment   Time In 1200         Time Out 1256         Minutes 56          total minutes: 2463 [facility]-30, OT

## 2021-10-11 NOTE — PLAN OF CARE
Problem: Pain:  Goal: Pain level will decrease  Description: Pain level will decrease  10/10/2021 2155 by Miya Busby RN  Outcome: Ongoing  Note: Pt given prn roxicodone for pain in LUE/shoulder r/t fracture     Problem: Skin Integrity:  Goal: Will show no infection signs and symptoms  Description: Will show no infection signs and symptoms  Outcome: Ongoing  Note: Patient has redness, excoriation to groin/domo area/ abdominal fold. Barrier cream applied. Pt turns self in bed, no other skin breakdown noted. Problem: Falls - Risk of:  Goal: Will remain free from falls  Description: Will remain free from falls  10/10/2021 2155 by Miya Busby RN  Outcome: Ongoing  Note: Pt at risk for skin breakdown. Skin assessment complete. No signs of skin breakdown. Pts skin cleansed with inc cleanser. Pt repositioned in bed with pillow support. Will continue to monitor.

## 2021-10-11 NOTE — DISCHARGE INSTR - COC
Continuity of Care Form    Patient Name: Soledad Whitney   :  1945  MRN:  [id number]    Admit date:  10/7/2021  Discharge date:  ***    Code Status Order: Full Code   Advance Directives:      Admitting Physician:  Joel Jennings DO  PCP: Francisco Hu MD    Discharging Nurse: [facility] Unit/Room#: 5950/0020-72  Discharging Unit Phone Number: ***    Emergency Contact:   Extended Emergency Contact Information  Primary Emergency Contact: Erlin Restrepo  Home Phone: [phone]  Relation: Child  Secondary Emergency Contact: [address], [address] Phone: [phone]  Relation: Child   needed?  No    Past Surgical History:  Past Surgical History:   Procedure Laterality Date    COLONOSCOPY      UPPER GASTROINTESTINAL ENDOSCOPY         Immunization History:   Immunization History   Administered Date(s) Administered    Influenza Vaccine, unspecified formulation 10/03/2014    Influenza Virus Vaccine 2015    Pneumococcal Conjugate 13-valent (Cznctnw91) 2018    Pneumococcal Polysaccharide (Euqclwojw32) 2015       Active Problems:  Patient Active Problem List   Diagnosis Code    Anemia D64.9    UTI (urinary tract infection) F22.7    Acute metabolic encephalopathy T87.97       Isolation/Infection:   Isolation            No Isolation          Patient Infection Status       Infection Onset Added Last Indicated Last Indicated By Review Planned Expiration Resolved Resolved By    None active    Resolved    C-diff Rule Out 10/08/21 10/08/21 10/08/21 Clostridium difficile toxin/antigen (Ordered)   10/08/21 Negrita Drake RN    Result negative from earlier today 10/8/2021    COVID-19 Rule Out 10/08/21 10/08/21 10/08/21 COVID-19 (Ordered)   10/09/21 Rule-Out Test Resulted    C-diff Rule Out 10/08/21 10/08/21 10/08/21 Clostridium Difficile Toxin/Antigen (Ordered)   10/08/21 Rule-Out Test Resulted            Nurse Assessment:  Last Vital Signs: /62   Pulse 64   Temp 97.5 °F (36.4 °C) (Oral)   Resp 18   Ht 5' 8\" (1.727 m)   Wt 164 lb (74.4 kg)   SpO2 92%   Breastfeeding No   BMI 24.94 kg/m²     Last documented pain score (0-10 scale): Pain Level: 0  Last Weight:   Wt Readings from Last 1 Encounters:   10/07/21 164 lb (74.4 kg)     Mental Status:  {IP PT MENTAL STATUS:}    IV Access:  { MORENITA IV ACCESS:755107460}    Nursing Mobility/ADLs:  Walking   {CHP DME LRVP:278360632}  Transfer  {CHP DME GZCA:964357058}  Bathing  {CHP DME ATDJ:590531277}  Dressing  {CHP DME GMUY:438446862}  Toileting  {CHP DME VKPW:666046947}  Feeding  {CHP DME KKBI:938673101}  Med Admin  {P DME QNLT:400272481}  Med Delivery   { MORENITA MED Delivery:669971019}    Wound Care Documentation and Therapy:        Elimination:  Continence: Bowel: {YES / TJ:87595}  Bladder: {YES / QZ:38156}  Urinary Catheter: {Urinary Catheter:864776959}   Colostomy/Ileostomy/Ileal Conduit: {YES / P}       Date of Last BM: ***    Intake/Output Summary (Last 24 hours) at 10/11/2021 1134  Last data filed at 10/11/2021 1119  Gross per 24 hour   Intake 10 ml   Output 1225 ml   Net -1215 ml     I/O last 3 completed shifts:   In: 240 [P.O.:240]  Out: 1200 [Urine:1200]    Safety Concerns:     [facility] Safety Concerns:963041834}    Impairments/Disabilities:      [facility] Impairments/Disabilities:471624093}    Nutrition Therapy:  Current Nutrition Therapy:   [address] [email or web address] MORENITA Diet List:356698710}    Routes of Feeding: {CHP DME Other Feedings:037944239}  Liquids: {Slp liquid thickness:52307}  Daily Fluid Restriction: {CHP DME Yes amt example:692598993}  Last Modified Barium Swallow with Video (Video Swallowing Test): {Done Not Done FBFJ:188979867}    Treatments at the Time of Hospital Discharge:   Respiratory Treatments: ***  Oxygen Therapy:  {Therapy; copd oxygen:40623}  Ventilator:    {GURDEEP SALDANA Vent KCLI:105697663}    Rehab Therapies: {THERAPEUTIC INTERVENTION:3367257998}  Weight Bearing Status/Restrictions: {GURDEEP SALDANA Weight Bearin}  Other Medical Equipment (for information only, NOT a DME order):  {EQUIPMENT:774022220}  Other Treatments: ***    Patient's personal belongings (please select all that are sent with patient):  {CHP DME Belongings:122874258}    RN SIGNATURE:  {Esignature:076181702}    CASE MANAGEMENT/SOCIAL WORK SECTION    Inpatient Status Date: ***    Readmission Risk Assessment Score:  Readmission Risk              Risk of Unplanned Readmission:  17           Discharging to Facility/  [facility] Details  [address] [facility], [address] [place] [address]       Phone: [phone]       Fax: [phone]            / signature: Electronically signed by ALLY Andrade, INDUW on 10/11/21 at 11:34 AM EDT    PHYSICIAN SECTION    Prognosis: Good    Condition at Discharge: Stable    Rehab Potential (if transferring to Rehab): Good    Recommended Labs or Other Treatments After Discharge:   PT/OT  Blood sugar monitoring  Blood sugars low-normal, metformin has been held  Treatment for UTI - one last dose 10/12 Levaquin will complete course  Follow-up with orthopedics outpatient for known fracture    Physician Certification: I certify the above information and transfer of Martin Skelton  is necessary for the continuing treatment of the diagnosis listed and that she requires Anthony Arana for greater 30 days.      Update Admission H&P: No change in H&P    PHYSICIAN SIGNATURE:  Electronically signed by Carla Jarquin DO on 10/11/21 at 1:04 PM EDT

## 2021-10-11 NOTE — PROGRESS NOTES
Physical Therapy  Facility/Department: [address]  Daily Treatment Note  NAME: Jesús Snow  : 1945  MRN: [id number]    Date of Service: 10/11/2021    Discharge Recommendations: Jesús Snow scored a 17/24 on the AM-PAC short mobility form. Current research shows that an AM-PAC score of 17 or less is typically not associated with a discharge to the patient's home setting. Based on the patient's AM-PAC score and their current functional mobility deficits, it is recommended that the patient have 3-5 sessions per week of Physical Therapy at d/c to increase the patient's independence. Please see assessment section for further patient specific details. If patient discharges prior to next session this note will serve as a discharge summary. Please see below for the latest assessment towards goals. PT Equipment Recommendations  Equipment Needed: No    Assessment   Assessment: Pt fearful and apprehensive of ambulation. Requires assist x 1 for safe transfers and amb as well as v/c for safe technique. Pt reports being \"overwhelmed\" by \"everything\". Plan is to d/c to SNF this PM.  Will f/u if d/c delayed. Treatment Diagnosis: Decreased functional mobility  Prognosis: Fair  PT Education: PT Role;General Safety  Patient Education: Pt verbalized understanding  REQUIRES PT FOLLOW UP: Yes     Patient Diagnosis(es): The primary encounter diagnosis was Benzodiazepine dependence ([address] Diagnoses of Urinary tract infection without hematuria, site unspecified, Diarrhea, unspecified type, Frequent falls, Fall, initial encounter, and Anxiety were also pertinent to this visit. has a past medical history of COVID-19, Diabetes mellitus ([address] Hyperlipidemia, Irritable bowel syndrome, On home oxygen therapy, Pulmonary fibrosis ([address] Thyroid disease, and UTI (urinary tract infection). has a past surgical history that includes Upper gastrointestinal endoscopy and Colonoscopy.     Restrictions  Position Activity Restriction  Other position/activity restrictions: Up with assist, seizure precautions  Subjective   General  Chart Reviewed: Yes  Additional Pertinent Hx: Pt is 67 yo F admitted on 10/7/21 with UTI and fall. H/o DM, UTI, HTN, thyroid disease, COVID 19, IBS, pulmonary fibrosis, UTI, L non-surgical humeral fx in June- per pt she is allowed activity as tolerated. Subjective  Subjective: Pt found sitting in chair. Agreeble to PT. \"I'm just overwhelmed with everything. \"  Pain Screening  Patient Currently in Pain: Yes (7/10 R pelvis, pt states she was medicated)       Orientation  Orientation  Overall Orientation Status: Within Functional Limits  Cognition      Objective   Bed mobility  Sit to Supine: Contact guard assistance (HOB flat, min cues for technique)  Scooting: Contact guard assistance  Transfers  Sit to Stand: Minimal Assistance (from chair x 2 trials)  Stand to sit: Minimal Assistance  Ambulation  Ambulation?: Yes  Ambulation 1  Device: Rolling Walker  Assistance: Contact guard assistance  Quality of Gait: slow, decreased step length and height, shaky but no overt LOB  Distance: 30 ft, 2 ft     Balance  Sitting - Static: Good  Sitting - Dynamic: Good  Standing - Static: Fair  Standing - Dynamic: Fair                           G-Code     OutComes Score                                                     AM-PAC Score  AM-PAC Inpatient Mobility Raw Score : 17 (10/11/21 1517)  AM-PAC Inpatient T-Scale Score : 42.13 (10/11/21 1517)  Mobility Inpatient CMS 0-100% Score: 50.57 (10/11/21 1517)  Mobility Inpatient CMS G-Code Modifier : CK (10/11/21 1517)          Goals  Short term goals  Time Frame for Short term goals: by discharge  Short term goal 1: Pt will perform bed mobility with supervision  ongoing  Short term goal 2: Pt will transfer sit to and from stand with SBA  ongoing  Short term goal 3: Pt will ambulate 80 feet with wheeled walker and SBA  ongoing    Plan    Plan  Times per week: 2-5  Current Treatment Recommendations: Strengthening, Balance Training, Functional Mobility Training, Gait Training, Transfer Training, Safety Education & Training  Safety Devices  Type of devices: Bed alarm in place, Call light within reach, Nurse notified, Gait belt, Left in bed     Therapy Time   Individual Concurrent Group Co-treatment   Time In 1445         Time Out 1510         Minutes 25                 Timed Code Treatment Minutes:  25    Total Treatment Minutes:  25    If patient is discharged prior to next treatment, this note will serve as the discharge summary.   Nadine Garcia, PT, DPT  [id number]

## 2021-10-12 NOTE — PROGRESS NOTES
Pt discharged to [address] at [facility], transported via stretcher by 85Patricia Kwon.  Pt sent with after visit summary,

## 2021-10-17 LAB — INTERPRETATION: NEGATIVE

## 2021-10-19 ASSESSMENT — ENCOUNTER SYMPTOMS
SORE THROAT: 0
NAUSEA: 1
ABDOMINAL PAIN: 0
BACK PAIN: 0
RHINORRHEA: 0
ABDOMINAL DISTENTION: 0
SHORTNESS OF BREATH: 0
SINUS PRESSURE: 0
COLOR CHANGE: 0
EYE ITCHING: 0
EYE REDNESS: 0
EYE PAIN: 0
DIARRHEA: 1
WHEEZING: 0
COUGH: 0
VOMITING: 1
CHEST TIGHTNESS: 0

## 2021-10-19 NOTE — ED PROVIDER NOTES
[facility]          PHYSICIAN ASSISTANT NOTE       Date of evaluation: 10/7/2021    Chief Complaint     Diarrhea and Urinary Tract Infection (known UTI on antibiotic)      History of Present Illness     Shyanne Brink is a 68 y.o. female with a past medical history as noted below who presents to the Emergency Department with a complaint of loose stools and possible urinary tract infection. Patient has a history of type 2 diabetes with peripheral neuropathy who is home O2 dependent secondary to pulmonary fibrosis who presents to the emergency department with a report of loose stools, weakness and concern for urinary tract infection. The patient does demonstrate some level of confusion and initially denied any recent falls, but reports that her loose stools, consistent with her irritable bowel syndrome has worsened. However, the patient has a recent history of mechanical falls and does have a recent visit to the hospital for humerus fracture 3 months ago. The patient advised EMS that she got up to use the restroom (and ultimately soiled herself), as she has had increased stool frequency with occasional fecal incontinence prior to getting to the bathroom and also felt weak, causing a slight mechanical fall. She denies any pain at this time. Per EMS, the patient was not able to get up from the ground on her own. She reports a decrease in oral intake. She has a history of pulmonary fibrosis and had COVID-19 earlier in the year and is currently on home oxygen. She denies any chest pain or difficulty breathing at this time. No abdominal pain, nausea or vomiting. Denies any bright red blood or dark, tarry stools. Denies any recent fevers, chills, sweats or other constitutional symptoms. Review of Systems     Review of Systems   Constitutional: Positive for activity change, appetite change and fatigue.  Negative for chills, diaphoresis, fever and unexpected weight change. HENT: Negative for congestion, drooling, mouth sores, postnasal drip, rhinorrhea, sinus pressure and sore throat. Eyes: Negative for pain, redness and itching. Respiratory: Negative for cough, chest tightness, shortness of breath and wheezing. Cardiovascular: Negative for chest pain, palpitations and leg swelling. Gastrointestinal: Positive for diarrhea, nausea and vomiting. Negative for abdominal distention and abdominal pain. Genitourinary: Negative for dysuria and hematuria. Musculoskeletal: Negative for arthralgias, back pain, gait problem, myalgias, neck pain and neck stiffness. Skin: Negative for color change, pallor and rash. Neurological: Positive for weakness. Negative for dizziness, syncope, light-headedness, numbness and headaches. Hematological: Does not bruise/bleed easily. Psychiatric/Behavioral: Negative for agitation, hallucinations, self-injury, sleep disturbance and suicidal ideas. The patient is not nervous/anxious. All other systems reviewed and are negative. Past Medical, Surgical, Family, and Social History     She has a past medical history of COVID-19, Diabetes mellitus ([address] Hyperlipidemia, Irritable bowel syndrome, On home oxygen therapy, Pulmonary fibrosis ([address] Thyroid disease, and UTI (urinary tract infection). She has a past surgical history that includes Upper gastrointestinal endoscopy and Colonoscopy. Her family history is not on file. She reports that she has never smoked. She has never used smokeless tobacco. She reports current alcohol use. She reports that she does not use drugs.     Medications     Discharge Medication List as of 10/11/2021  3:49 PM      CONTINUE these medications which have NOT CHANGED    Details   dicyclomine (BENTYL) 20 MG tablet Take 20 mg by mouth every 4-6 hours as neededHistorical Med      citalopram (CELEXA) 20 MG tablet Take 20 mg by mouth dailyHistorical Med      levothyroxine (SYNTHROID) 137 MCG tablet Take 137 mcg by mouth DailyHistorical Med      pantoprazole (PROTONIX) 40 MG tablet Take 40 mg by mouth dailyHistorical Med      simvastatin (ZOCOR) 20 MG tablet Take 20 mg by mouth nightlyHistorical Med      Ergocalciferol (VITAMIN D2 PO) Take 50,000 Units by mouth once a weekHistorical Med      furosemide (LASIX) 40 MG tablet Take 1 tablet by mouth daily, Disp-60 tablet, R-3Print      ferrous sulfate 325 (65 Fe) MG tablet Take 1 tablet by mouth 2 times daily, Disp-180 tablet, R-3Print      aspirin 81 MG tablet Take 81 mg by mouth dailyHistorical Med             Allergies     She is allergic to lisinopril and pcn [penicillins]. Physical Exam     INITIAL VITALS: BP: (!) 130/52, Temp: 98 °F (36.7 °C), Pulse: 67, Resp: 18, SpO2: 94 %  Physical Exam  Vitals and nursing note reviewed. Constitutional:       General: She is not in acute distress. Appearance: She is well-developed. She is ill-appearing. She is not diaphoretic. HENT:      Head: Normocephalic and atraumatic. Eyes:      General: No visual field deficit or scleral icterus. Pupils: Pupils are equal, round, and reactive to light. Neck:      Vascular: No JVD. Cardiovascular:      Rate and Rhythm: Normal rate and regular rhythm. Pulmonary:      Effort: Pulmonary effort is normal. No respiratory distress. Breath sounds: Normal breath sounds. No stridor. Abdominal:      Palpations: Abdomen is soft. Tenderness: There is no abdominal tenderness. Musculoskeletal:         General: Normal range of motion. Cervical back: Normal range of motion. Skin:     General: Skin is warm and dry. Findings: No rash. Comments: The patient is soiled with fecal incontinence as she was not able to make it to the bathroom because of the mechanical fall, resulting in her visit to the emergency department tonight. Neurological:      Mental Status: She is oriented to person, place, and time. GCS: GCS eye subscore is 4.  GCS verbal subscore is 5. GCS motor subscore is 6. Cranial Nerves: No cranial nerve deficit, dysarthria or facial asymmetry. Sensory: No sensory deficit. Motor: No tremor, abnormal muscle tone or pronator drift. Comments: Patient may have some slight confusion, though difficult to tell without truly knowing baseline. No gross or focal neurological deficits otherwise. Diagnostic Results     RADIOLOGY:  CT ABDOMEN PELVIS W IV CONTRAST Additional Contrast? None   Final Result      1. Severe diverticulosis in the sigmoid colon. No evidence of acute diverticulitis. 2.  Cholelithiasis. 3.  Stable right adrenal nodule. 4.  Mild retroperitoneal and iliac lymphadenopathy unchanged. 5.  Hepatomegaly and diffuse steatosis. 6.  Cardiomegaly. LABS:   Results for orders placed or performed during the hospital encounter of 10/07/21   Gastrointestinal Panel by DNA    Specimen: Stool   Result Value Ref Range    GI Bacterial Pathogens By PCR       No Shigella spp/EIEC DNA detected  No Shiga toxin-producing gene(s) detected  No Campylobacter spp. (jejuni and coli)DNA detected  No Salmonella spp. DNA detected  Normal Range:  None detected     Clostridium Difficile Toxin/Antigen    Specimen: Stool   Result Value Ref Range    C.diff Toxin/Antigen       Negative for Clostridium difficile antigen and toxin  Normal Range: Negative     Ova & parasite ID/count #1    Specimen: Stool   Result Value Ref Range    Interpretation Negative Negative   Culture, Urine    Specimen: Urine, clean catch   Result Value Ref Range    Organism Klebsiella pneumoniae (A)     Urine Culture, Routine >100,000 CFU/ml        Susceptibility    Klebsiella pneumoniae - BACTERIAL SUSCEPTIBILITY PANEL BY JIMMIE     ampicillin  Resistant mcg/mL     ceFAZolin* <=4 Sensitive mcg/mL      * NOTE: Cefazolin should only be used for uncomplicated UTI      for E.coli or Klebsiella pneumoniae.      cefepime <=0.12 Sensitive mcg/mL     cefTRIAXone <=0.25 Sensitive mcg/mL     ciprofloxacin <=0.25 Sensitive mcg/mL     ertapenem <=0.12 Sensitive mcg/mL     gentamicin <=1 Sensitive mcg/mL     levofloxacin <=0.12 Sensitive mcg/mL     nitrofurantoin 32 Sensitive mcg/mL     piperacillin-tazobactam <=4 Sensitive mcg/mL     trimethoprim-sulfamethoxazole <=20 Sensitive mcg/mL   Basic Metabolic Panel w/ Reflex to MG   Result Value Ref Range    Sodium 138 136 - 145 mmol/L    Potassium reflex Magnesium 4.7 3.5 - 5.1 mmol/L    Chloride 100 99 - 110 mmol/L    CO2 26 21 - 32 mmol/L    Anion Gap 12 3 - 16    Glucose 98 70 - 99 mg/dL    BUN 11 7 - 20 mg/dL    CREATININE <0.5 (L) 0.6 - 1.2 mg/dL    GFR Non-African American >60 >60    GFR African American >60 >60    Calcium 8.9 8.3 - 10.6 mg/dL   CBC Auto Differential   Result Value Ref Range    WBC 4.5 4.0 - 11.0 K/uL    RBC 3.56 (L) 4.00 - 5.20 M/uL    Hemoglobin 11.4 (L) 12.0 - 16.0 g/dL    Hematocrit 34.0 (L) 36.0 - 48.0 %    MCV 95.4 80.0 - 100.0 fL    MCH 32.0 26.0 - 34.0 pg    MCHC 33.6 31.0 - 36.0 g/dL    RDW 20.5 (H) 12.4 - 15.4 %    Platelets 923 934 - 771 K/uL    MPV 7.8 5.0 - 10.5 fL    Neutrophils % 75.1 %    Lymphocytes % 13.0 %    Monocytes % 8.1 %    Eosinophils % 3.2 %    Basophils % 0.6 %    Neutrophils Absolute 3.3 1.7 - 7.7 K/uL    Lymphocytes Absolute 0.6 (L) 1.0 - 5.1 K/uL    Monocytes Absolute 0.4 0.0 - 1.3 K/uL    Eosinophils Absolute 0.1 0.0 - 0.6 K/uL    Basophils Absolute 0.0 0.0 - 0.2 K/uL   Rotavirus antigen, stool   Result Value Ref Range    Rotavirus Negative Negative   Urinalysis Reflex to Culture    Specimen: Urine, clean catch   Result Value Ref Range    Color, UA Yellow Straw/Yellow    Clarity, UA Clear Clear    Glucose, Ur Negative Negative mg/dL    Bilirubin Urine Negative Negative    Ketones, Urine Negative Negative mg/dL    Specific Gravity, UA <=1.005 1.005 - 1.030    Blood, Urine MODERATE (A) Negative    pH, UA 6.5 5.0 - 8.0    Protein, UA Negative Negative mg/dL    Urobilinogen, Urine 0. 2 <2.0 E.U./dL    Nitrite, Urine POSITIVE (A) Negative    Leukocyte Esterase, Urine LARGE (A) Negative    Microscopic Examination YES     Urine Type Voided     Urine Reflex to Culture Yes    Microscopic Urinalysis   Result Value Ref Range    WBC, UA  (A) 0 - 5 /HPF    RBC, UA 11-20 (A) 0 - 4 /HPF    Bacteria, UA 4+ (A) None Seen /HPF       ED BEDSIDE ULTRASOUND:  N/A    RECENT VITALS:  BP: 120/62, Temp: 97.9 °F (36.6 °C), Pulse: 58, Resp: 16, SpO2: 98 %     Procedures     N/A    ED Course     Nursing Notes, Past Medical Hx,Past Surgical Hx, Social Hx, Allergies, and Family Hx were reviewed. The patient was given the following medications:  Orders Placed This Encounter   Medications    lactated ringers infusion 1,000 mL    dicyclomine (BENTYL) injection 20 mg    ALPRAZolam (XANAX) tablet 0.5 mg    iopamidol (ISOVUE-370) 76 % injection 80 mL    ALPRAZolam (XANAX) tablet 0.5 mg    DISCONTD: levoFLOXacin (LEVAQUIN) 750 MG/150ML infusion 750 mg     Order Specific Question:   Antimicrobial Indications     Answer:   Urinary Tract Infection       CONSULTS:  IP CONSULT TO HOSPITALIST  IP CONSULT TO SOCIAL WORK  IP CONSULT TO [facility] Nioclas Reece / JOSEFINA / Jazmyne Munoz is admitted to the Emergency Department for evaluation of her chief complaint as described in the history of present illness. Complete history and physical was performed by me and my attending. Nursing notes, past medical history, surgical history, family history and social history were reviewed and addressed in the HPI. Nicky Goldberg is a chronically ill-appearing 68 y.o. female who presents to the emergency department with a complaint of increasing and worsening persistent loose stools, weakness, decreased activity, and a history of recent falls. The patient presents via EMS after a fall while attempting to get to the bathroom.   The patient sustained a mechanical fall prior to arriving at the bathroom and has evidence of fecal incontinence of the lower extremities and pelvis as a result. The patient reports that this is happened several times recently as the frequency of her loose stools is increased. She recently was diagnosed with a urinary tract infection and had been on antibiotics previously, but does not appear to currently be on antibiotics. Also demonstrates a humerus fracture 3 months ago with a noted history of frequent recent falls. Patient is very weak with standing and demonstrates poor ambulation. The patient notes that she may also have a urinary tract infection, but does not feel any symptoms. She reports that she has had some increase in urinary frequency. On presentation to the emergency department, the patient is hemodynamically stable and within normal limits. On diagnostic evaluation, the patient demonstrates a CBC without leukocytosis. She has a stable, normocytic anemia with a hemoglobin of 11.4 and hematocrit of 34. Noted slight lymphopenia. Platelet count is normal.  Her BMP demonstrates no electrolyte or renal function abnormalities. Her anion gap and bicarbonate levels are normal.  Stool cultures and stool panel was ordered as the patient has significant loose stools, demonstrated with a couple of additional episodes in the emergency department. Her urinalysis is positive for nitrates and leukocytes, and on microscopic urinalysis demonstrates pyuria, hematuria and bacteriuria. Culture is pending at this time. We will start the patient on Levaquin for broad-spectrum coverage, given her history, and her C. difficile antigen/toxin test is negative. Given her nonspecific complaints and findings, as well as her history of diverticulosis, a CT scan of the abdomen and pelvis was performed to help rule out any other concerning issues in the differential.  Her CT demonstrates severe diverticulosis without diverticulitis.   She also has evidence of cholelithiasis without cholecystitis. There is noted, mild retroperitoneal and iliac lymphadenopathy which is unchanged from previous study as is hepatomegaly with diffuse steatosis and cardiomegaly. The patient demonstrates no evidence of hypoxia on her home oxygen, which will be maintained. Given her weakness, evidence of urinary tract infection, concerns for frequent falls and her baseline anxiety for which she takes Xanax, we will admit the patient to the hospital for further evaluation management. I discussed this plan at length the patient who verbalizes understanding and is in agreement. The patient was seen and evaluated by myself and the physician assistant student, as well as the attending physician, Brooke Carvalho MD, who agrees with my assessment, treatment and plan. Clinical Impression     1. Urinary tract infection without hematuria, site unspecified    2. Diarrhea, unspecified type    3. Frequent falls    4. Fall, initial encounter    5. Anxiety        Disposition     PATIENT REFERRED TO:  MICHAEL Moreno at [place] [phone]  [address] [place] [address] [address] [place] [phone]    Schedule an appointment as soon as possible for a visit      Ada Andrews MD    Schedule an appointment as soon as possible for a visit  Fracture follow-up      DISCHARGE MEDICATIONS:  Discharge Medication List as of 10/11/2021  3:49 PM      START taking these medications    Details   oxyCODONE (ROXICODONE) 5 MG immediate release tablet Take 0.5 tablets by mouth every 8 hours as needed for Pain for up to 3 days. , Disp-5 tablet, R-0Print      levoFLOXacin (LEVAQUIN) 750 MG tablet Take 1 tablet by mouth daily for 1 day, Disp-1 tablet, R-0NO PRINT      Multiple Vitamin (MULTIVITAMIN) TABS tablet Take 1 tablet by mouth daily, R-0OTC      thiamine mononitrate (THIAMINE) 100 MG tablet Take 1 tablet by mouth daily, Disp-30 tablet, R-0NO PRINT DISPOSITION Admitted 10/08/2021 03:12:44 AM                                                                               [address], [place]  10/19/21 1967

## 2021-11-07 PROBLEM — N39.0 UTI (URINARY TRACT INFECTION): Status: RESOLVED | Noted: 2021-10-08 | Resolved: 2021-11-07

## 2022-10-05 NOTE — PROGRESS NOTES
Physician Progress Note      Phuc Emery  [facility] #:                  [id number]  :                       1945  ADMIT DATE:       10/7/2021 11:16 PM  DISCH DATE:        10/11/2021 5:00 PM  RESPONDING  PROVIDER #:        Elaina Umana DO          QUERY TEXT:    Patient admitted 10/7- 10/11 with UTI and diarrhea and recent mechanical   falls. Noted documentation of Metabolic encephalopathy dated 10/8 in the   discharge summary. Noted to be A/O x 3 per ED exam with ? slight confusion and   \"seems a bit confused\" per H&P, yet Neuro exam \"grossly normal\". No confusion   noted by nursing. In order to support the diagnosis of Metabolic   encephalopathy, please include additional clinical indicators in your   documentation. Or please document if the diagnosis of Metabolic   encephalopathy has been ruled out after further study. The medical record reflects the following:  Risk Factors: 68 y.o., ETOH abuse  Clinical Indicators: Per Admitting RN \"A/O x 4\". Per ED \"oriented to person,   place, and time. may have some slight confusion, though difficult to tell   without truly knowing baseline\". Per H&P \"seems a bit confused and tells me   she is not here for falls. She says she is here for diarrhea\", Per exam   \"Neurologic: Grossly normal\". Per pn 10/9 \"Mental status appears improved- UTI   with some mental status changes\". No noted hx of Dementia. No confusion noted   by nursing throughout admission, nor alcohol w/d  Treatment: Levaquin, 1L LR bolus, Xanax x 2 on 10/8  & PRN nightly, CIWA   Ativan x 3, CT Abdomen  Options provided:  -- Metabolic encephalopathy present as evidenced by, Please document evidence.   -- Metabolic encephalopathy was ruled out  -- Other - I will add my own diagnosis  -- Disagree - Not applicable / Not valid  -- Disagree - Clinically unable to determine / Unknown  -- Refer to Clinical Documentation Reviewer    PROVIDER RESPONSE TEXT:    Metabolic encephalopathy was ruled [FreeTextEntry1] : Pt notes that she continues to have difficulties concentrating in class and notes becoming easily distracted. She reports that she is having to overcompensate by studying additional hours, she does not feel current dose of Concerta 27mg is effective, was previously on 36mg. She is requiring booster dose at around 1pm, but notes that also wears off after a few hours. She denies any side effects from Concerta, denies worsening anxiety, denies palpitation. She notes fair sleep/appetite. Denies suicidal thoughts. She remains future oriented and functions well at school. Denies depressive symptoms.  out after study. Query created by:  Patricia Reyna on 11/2/2021 4:23 PM      Electronically signed by:  Em Kebede DO 11/8/2021 2:45 PM

## 2023-03-14 ENCOUNTER — HOSPITAL ENCOUNTER (EMERGENCY)
Age: 78
Discharge: HOME | End: 2023-03-14
Attending: EMERGENCY MEDICINE
Payer: MEDICARE

## 2023-03-14 VITALS
BODY MASS INDEX: 33.56 KG/M2 | HEIGHT: 68 IN | HEART RATE: 77 BPM | OXYGEN SATURATION: 93 % | RESPIRATION RATE: 22 BRPM | WEIGHT: 221.4 LBS | TEMPERATURE: 98.1 F | SYSTOLIC BLOOD PRESSURE: 152 MMHG | DIASTOLIC BLOOD PRESSURE: 70 MMHG

## 2023-03-14 DIAGNOSIS — L97.921 ULCER OF LEFT LOWER EXTREMITY, LIMITED TO BREAKDOWN OF SKIN (HCC): ICD-10-CM

## 2023-03-14 DIAGNOSIS — E11.42 DIABETIC POLYNEUROPATHY ASSOCIATED WITH TYPE 2 DIABETES MELLITUS (HCC): ICD-10-CM

## 2023-03-14 DIAGNOSIS — L97.911 ULCER OF RIGHT LOWER EXTREMITY, LIMITED TO BREAKDOWN OF SKIN (HCC): Primary | ICD-10-CM

## 2023-03-14 LAB
ANION GAP SERPL CALCULATED.3IONS-SCNC: 10 MMOL/L (ref 3–16)
BUN SERPL-MCNC: 8 MG/DL (ref 7–20)
CALCIUM SERPL-MCNC: 8.4 MG/DL (ref 8.3–10.6)
CHLORIDE SERPL-SCNC: 95 MMOL/L (ref 99–110)
CO2 SERPL-SCNC: 29 MMOL/L (ref 21–32)
CREAT SERPL-MCNC: 0.6 MG/DL (ref 0.6–1.2)
GFR SERPLBLD CREATININE-BSD FMLA CKD-EPI: >60 ML/MIN/{1.73_M2}
GLUCOSE BLD-MCNC: 87 MG/DL (ref 70–99)
GLUCOSE SERPL-MCNC: 106 MG/DL (ref 70–99)
PERFORMED ON: NORMAL
POTASSIUM SERPL-SCNC: 4.4 MMOL/L (ref 3.5–5.1)
SODIUM SERPL-SCNC: 134 MMOL/L (ref 136–145)

## 2023-03-14 PROCEDURE — 6370000000 HC RX 637 (ALT 250 FOR IP): Performed by: EMERGENCY MEDICINE

## 2023-03-14 PROCEDURE — 99284 EMERGENCY DEPT VISIT MOD MDM: CPT

## 2023-03-14 PROCEDURE — 80048 BASIC METABOLIC PNL TOTAL CA: CPT

## 2023-03-14 RX ORDER — GABAPENTIN 100 MG/1
100 CAPSULE ORAL ONCE
Status: COMPLETED | OUTPATIENT
Start: 2023-03-14 | End: 2023-03-14

## 2023-03-14 RX ORDER — GABAPENTIN 100 MG/1
100 CAPSULE ORAL 2 TIMES DAILY PRN
Qty: 28 CAPSULE | Refills: 0 | Status: SHIPPED | OUTPATIENT
Start: 2023-03-14 | End: 2023-03-28

## 2023-03-14 RX ADMIN — GABAPENTIN 100 MG: 100 CAPSULE ORAL at 15:48

## 2023-03-14 NOTE — DISCHARGE INSTR - COC
Continuity of Care Form    Patient Name: Dhruv Ng   :  1945  MRN:  [id number]    Admit date:  3/14/2023  Discharge date:  ***    Code Status Order: Prior   Advance Directives:     Admitting Physician:  No admitting provider for patient encounter. PCP: Edis Lopez MD    Discharging Nurse: [facility] Unit/Room#: A02/A02-02A  Discharging Unit Phone Number: ***    Emergency Contact:   Extended Emergency Contact Information  Primary Emergency Contact: Matthew Dasilva  Home Phone: [phone]  Relation: Child  Secondary Emergency Contact: Karen Godoy  Home Phone: [phone]  Relation: Child   needed?  No    Past Surgical History:  Past Surgical History:   Procedure Laterality Date    COLONOSCOPY      UPPER GASTROINTESTINAL ENDOSCOPY         Immunization History:   Immunization History   Administered Date(s) Administered    Influenza Vaccine, unspecified formulation 10/03/2014    Influenza Virus Vaccine 2015    Pneumococcal Conjugate 13-valent (Rdwrwnb50) 2018    Pneumococcal Polysaccharide (Xhenvejzj35) 2015       Active Problems:  Patient Active Problem List   Diagnosis Code    Anemia D85.6    Acute metabolic encephalopathy A17.98       Isolation/Infection:   Isolation            No Isolation          Patient Infection Status       Infection Onset Added Last Indicated Last Indicated By Review Planned Expiration Resolved Resolved By    None active    Resolved    C-diff Rule Out 10/08/21 10/08/21 10/08/21 Clostridium difficile toxin/antigen (Ordered)   10/08/21 Ramana Drake, RN    Result negative from earlier today 10/8/2021    COVID-19 (Rule Out) 10/08/21 10/08/21 10/08/21 COVID-19 (Ordered)   10/09/21 Rule-Out Test Resulted    C-diff Rule Out 10/08/21 10/08/21 10/08/21 Clostridium Difficile Toxin/Antigen (Ordered)   10/08/21 Rule-Out Test Resulted            Nurse Assessment:  Last Vital Signs: BP (!) 152/70   Pulse 77   Temp 98.1 °F (36.7 °C) (Oral)   Resp 22   Ht 5' 8\" (1.727 m)   Wt 221 lb 6.4 oz (100.4 kg)   SpO2 93%   BMI 33.66 kg/m²     Last documented pain score (0-10 scale):    Last Weight:   Wt Readings from Last 1 Encounters:   23 221 lb 6.4 oz (100.4 kg)     Mental Status:  {IP PT MENTAL STATUS:37458}    IV Access:  { MORENITA IV ACCESS:464229283}    Nursing Mobility/ADLs:  Walking   {CHP DME ADLs:509658030}  Transfer  {CHP DME ADLs:571587058}  Bathing  {CHP DME ADLs:810339423}  Dressing  {CHP DME ADLs:451867351}  Toileting  {CHP DME ADLs:705967770}  Feeding  {CHP DME ADLs:931798861}  Med Admin  {CHP DME ADLs:306017907}  Med Delivery   { MORENITA MED Delivery:261636774}    Wound Care Documentation and Therapy:        Elimination:  Continence:   Bowel: {YES / NO:}  Bladder: {YES / NO:}  Urinary Catheter: {Urinary Catheter:429061248}   Colostomy/Ileostomy/Ileal Conduit: {YES / NO:}       Date of Last BM: ***  No intake or output data in the 24 hours ending 23 1618  No intake/output data recorded.    Safety Concerns:     { MORENITA Safety Concerns:318365124}    Impairments/Disabilities:      {[facility] Impairments/Disabilities:613086375}    Nutrition Therapy:  Current Nutrition Therapy:   { MORENITA Diet List:369323600}    Routes of Feeding: {P DME Other Feedings:840658369}  Liquids: {Slp liquid thickness:99285}  Daily Fluid Restriction: {CHP DME Yes amt example:496619518}  Last Modified Barium Swallow with Video (Video Swallowing Test): {Done Not Done Date:}    Treatments at the Time of Hospital Discharge:   Respiratory Treatments: ***  Oxygen Therapy:  {Therapy; copd oxygen:11757}  Ventilator:    { CC Vent List:823816514}    Rehab Therapies: {THERAPEUTIC INTERVENTION:5165318792}  Weight Bearing Status/Restrictions: {[facility] Weight Bearin}  Other Medical Equipment (for information only, NOT a DME order):  {EQUIPMENT:431467288}  Other Treatments: ***    Patient's personal belongings (please select all that  are sent with patient):  {LINDA MARTÍNEZ Belongings:974148344}    RN SIGNATURE:  {Esignature:533458043}    CASE MANAGEMENT/SOCIAL WORK SECTION    Inpatient Status Date: ***    Readmission Risk Assessment Score:  Readmission Risk              Risk of Unplanned Readmission:  0           Discharging to Facility/ Agency   Name:   Address:  Phone:  Fax:    Dialysis Facility (if applicable)   Name:  Address:  Dialysis Schedule:  Phone:  Fax:    / signature: {Esignature:142279998}    PHYSICIAN SECTION    Prognosis: {Prognosis:5657483379}    Condition at Discharge: [address] [place] [address] Patient Condition:622906674}    Rehab Potential (if transferring to Rehab): {Prognosis:8527102899}    Recommended Labs or Other Treatments After Discharge: ***    Physician Certification: I certify the above information and transfer of Vi Mehta  is necessary for the continuing treatment of the diagnosis listed and that she requires {Admit to Appropriate Level of Care:03291} for {GREATER/LESS:796579052} 30 days.      Update Admission H&P: [facility]}    PHYSICIAN SIGNATURE:  {Esignature:400743900}

## 2023-03-14 NOTE — CARE COORDINATION
A case management consult is noted to see Ms. Leopoldo Fortis in the emergency department regarding home health assistance with wound care. CM met with Ms. Fernandez and her daughter at the bedside. She is alert and oriented x 4, pleasant, and easy to engage. She confirmed she lives at [employer] in an independent living apartment. She has had access to [facility] through [facility] in the past. She is open to a new referral to assist with the dressing changes. Referral to:  [address] [place] [address] [address]  Phone: [phone] Fax: [phone]  Left a message for Juan Godoy in admissions.     Megan Cruz, MSN RN-BC [facility]-BC ACM-RN  Case Management  [phone]

## 2023-03-14 NOTE — ED PROVIDER NOTES
[address] [place]          ATTENDING PHYSICIAN NOTE       Date of evaluation: 3/14/2023    Chief Complaint     Leg Swelling (Patient has some leg swelling at baseline, maybe getting worse per her report but now also has open areas on the lower legs that are painful. She was seen at urgent care for this and sent for \"concern about circulation\". )      History of Present Illness     Steph Duffy is a 68 y.o. female who presents to the emergency department with concerns regarding new ulceration of the right lower extremity, with what appears to be an additional developing ulceration of the left lower extremity. Patient states that she has chronic edema and neuropathy of her bilateral lower extremities, which she has been told is attributed to diabetes. She follows with podiatry for this, and takes gabapentin at night for her neuropathic pain. Patient stated that recently she developed an opening in the skin over the mid right shin, which is draining clear yellowish fluid. She was concerned about infection, so called her podiatrist office, and was referred to go to an urgent care or to the emergency department. She initially went to an urgent care, where she was told that she should come to the emergency department because they were concerned about her circulation. Patient notes no fevers or chills, nausea or vomiting. She feels that the edema in her legs is approximately baseline. She feels that the coloration of the lower extremities and dryness/flaking of the skin is also approximately baseline. The ulceration over the right shin, with a similar, smaller ulceration over the lateral aspect of the left lower leg are her primary concerns today. She also has noticed increased neuropathic pain, and localized pain and irritation related to the wound.   The patient has a significant history of pulmonary fibrosis, and is on several liters nasal cannula oxygen supplementation at baseline, although she often does not use a portable oxygen canister when traveling short distances. She denies any change in her baseline shortness of breath. She denies any chest pain. Review of Systems     Review of Systems   All other systems reviewed and are negative. Past Medical, Surgical, Family, and Social History     She has a past medical history of COVID-19, Diabetes mellitus ([address] Hyperlipidemia, Irritable bowel syndrome, On home oxygen therapy, Pulmonary fibrosis ([address] Thyroid disease, and UTI (urinary tract infection). She has a past surgical history that includes Upper gastrointestinal endoscopy and Colonoscopy. Her family history is not on file. She reports that she has never smoked. She has never used smokeless tobacco. She reports current alcohol use. She reports that she does not use drugs. Medications     Previous Medications    ASPIRIN 81 MG TABLET    Take 81 mg by mouth daily    CITALOPRAM (CELEXA) 20 MG TABLET    Take 20 mg by mouth daily    DICYCLOMINE (BENTYL) 20 MG TABLET    Take 20 mg by mouth every 4-6 hours as needed    ERGOCALCIFEROL (VITAMIN D2 PO)    Take 50,000 Units by mouth once a week    FERROUS SULFATE 325 (65 FE) MG TABLET    Take 1 tablet by mouth 2 times daily    FUROSEMIDE (LASIX) 40 MG TABLET    Take 1 tablet by mouth daily    LEVOTHYROXINE (SYNTHROID) 137 MCG TABLET    Take 137 mcg by mouth Daily    METFORMIN (GLUCOPHAGE) 1000 MG TABLET    Take 1 tablet by mouth 2 times daily (with meals) Hold for now and follow-up with primary provider for further management. MULTIPLE VITAMIN (MULTIVITAMIN) TABS TABLET    Take 1 tablet by mouth daily    PANTOPRAZOLE (PROTONIX) 40 MG TABLET    Take 40 mg by mouth daily    SIMVASTATIN (ZOCOR) 20 MG TABLET    Take 20 mg by mouth nightly    THIAMINE MONONITRATE (THIAMINE) 100 MG TABLET    Take 1 tablet by mouth daily       Allergies     She is allergic to lisinopril and pcn [penicillins].     Physical Exam     INITIAL VITALS: BP: (!) 152/70, Temp: 98.1 °F (36.7 °C), Heart Rate: 77, Resp: 22, SpO2: 93 %     General: Generally well-developed, and somewhat chronically ill-appearing elderly patient. She is very pleasantly conversational, and in no acute distress. HEENT: Pupils are equal, round, and reactive to light. Extraocular muscles are intact. Conjunctivae are clear and moist. No redness or drainage from the eyes. No drainage from the nose. The oropharynx appeared to be normal.    Neck: Supple, with full range of motion. Cardiovascular: Normal S1-S2 without murmur rub or gallop. 2+ radial pulses bilaterally. 2+ DP pulses bilaterally. Respiratory: Mildly tachypneic, but otherwise unlabored breathing with equal chest rise and fall. Fine crackles noted bilaterally. Abdomen: Protuberant, but soft and nontender, without guarding or rebound tenderness. No masses or hepatosplenomegaly. Skin: Generally warm and dry, without rashes or ecchymoses. On focused examination of the skin of the lower extremities, there is significant skin changes of chronic venous stasis and neuropathy, with dryness and flaking of the skin, nonblanching rubor, and hair loss. Over the mid right shin, there is an approximately nickel sized shallow ulceration, with scant drainage of serous fluid. There is no purulence. There is no surrounding warmth or erythema. Over the lateral aspect of the left lower leg, there is some serous crusting, indicative of a very small underlying ulceration. No purulence. No surrounding warmth or erythema. Neuro: Alert and oriented x3. No focal neurologic deficits are noted. Extremities: Warm and well-perfused, without clubbing, cyanosis, or edema. The patient moves all extremities equally. Psych: The patient's mood and affect are generally within normal limits for their presentation.       Diagnostic Results       RADIOLOGY:  No orders to display       LABS:   Results for orders placed or performed during the hospital encounter of 06/83/99   Basic Metabolic Panel w/ Reflex to MG   Result Value Ref Range    Sodium 134 (L) 136 - 145 mmol/L    Potassium reflex Magnesium 4.4 3.5 - 5.1 mmol/L    Chloride 95 (L) 99 - 110 mmol/L    CO2 29 21 - 32 mmol/L    Anion Gap 10 3 - 16    Glucose 106 (H) 70 - 99 mg/dL    BUN 8 7 - 20 mg/dL    Creatinine 0.6 0.6 - 1.2 mg/dL    Est, Glom Filt Rate >60 >60    Calcium 8.4 8.3 - 10.6 mg/dL   POCT Glucose   Result Value Ref Range    POC Glucose 87 70 - 99 mg/dl    Performed on ACCU-CHEK          RECENT VITALS:  BP: (!) 152/70, Temp: 98.1 °F (36.7 °C), Heart Rate: 77, Resp: 22, SpO2: 93 %     Procedures         ED Course     Nursing Notes, Past Medical Hx, Past Surgical Hx, Social Hx, Allergies, and Family Hx were reviewed. The patient was given the following medications:  Orders Placed This Encounter   Medications    gabapentin (NEURONTIN) capsule 100 mg    gabapentin (NEURONTIN) 100 MG capsule     Sig: Take 1 capsule by mouth 2 times daily as needed (leg neuropathy pain) for up to 14 days. Continue to take 200 mg at bedtime, as prescribed by your podiatrist.  With this prescription, you may additionally take a dose of 100 mg in the morning, and 100 mg at lunch, if desired. Dispense:  28 capsule     Refill:  0       CONSULTS:  IP CONSULT TO CASE MANAGEMENT  IP CONSULT TO HOME CARE NEEDS    MEDICAL DECISION MAKING / ASSESSMENT / Sole Marva is a 68 y.o. female with a past medical history of pulmonary fibrosis, as described above, although with baseline levels of oxygen need and dyspnea on exertion, as well as peripheral neuropathy and chronic bilateral lower extremity edema followed by podiatry, who presents due to concerns for new development of a shallow ulceration over the right anterior mid shin with serous drainage, and a smaller similar ulceration over the lateral aspect of the left lower extremity.   She feels that the chronic edema and skin changes of her lower extremities are otherwise essentially unchanged. She was concern for potential infection of the ulcerations. There is no evidence of ulceration, and she was reassured that the clear yellowish serous fluid that is draining is not indicative of infection. Additionally, there was concern from a recent urgent care visit that the ulcerations may be poor due to poor circulation in the lower extremities. Certainly, the patient has findings of microvascular insufficiency, but she has excellent DP pulses bilaterally, and there is no finding concerning for significant occlusive peripheral arterial disease. Outpatient follow-up with the patient's podiatrist, and potential referral to wound care for her newly developed ulcerations is appropriate. As the patient indicated that she had not had blood work checked for some time, and had worsening findings of presumed diabetic polyneuropathy, basic metabolic panel was checked, which does not show acute metabolic sequelae of uncontrolled diabetes mellitus at this time. Additionally, the patient was given a dose of gabapentin in the emergency department for worsening of neuropathic pain. At present, she takes 200 mg of gabapentin at night only. She was given an additional prescription of gabapentin, and advised that she can take 100 mg in the morning and at the midday, in order to provide improved neuropathic pain control throughout the day. The patient's ulcerations were dressed in the emergency department. She noted, at that time, that she is not able to bend down to reach her legs, and cannot therefore perform dressing changes until she is established and follow-up with wound care. As result of this, case management was consulted, and was able to arrange for home care services for dressing changes.     Medical Decision Making  Problems Addressed:  Diabetic polyneuropathy associated with type 2 diabetes mellitus ([facility]): chronic illness or injury with exacerbation, progression, or side effects of treatment  Ulcer of left lower extremity, limited to breakdown of skin [facility]): acute illness or injury  Ulcer of right lower extremity, limited to breakdown of skin [facility]): acute illness or injury    Amount and/or Complexity of Data Reviewed  Independent Historian: caregiver     Details: Daughter  External Data Reviewed: notes. Details: Previous podiatry notes  Labs: ordered. Decision-making details documented in ED Course. Discussion of management or test interpretation with external provider(s): Case management    Risk  Prescription drug management. Clinical Impression     1. Ulcer of right lower extremity, limited to breakdown of skin ([address]    2. Ulcer of left lower extremity, limited to breakdown of skin ([address]    3. Diabetic polyneuropathy associated with type 2 diabetes mellitus ([address]        Disposition     PATIENT REFERRED TO:  [address]  [facility] [id number] [phone]    Call in 1 day  To discuss your ER visit, and arrange a follow-up appointment. DISCHARGE MEDICATIONS:  New Prescriptions    GABAPENTIN (NEURONTIN) 100 MG CAPSULE    Take 1 capsule by mouth 2 times daily as needed (leg neuropathy pain) for up to 14 days. Continue to take 200 mg at bedtime, as prescribed by your podiatrist.  With this prescription, you may additionally take a dose of 100 mg in the morning, and 100 mg at lunch, if desired. DISPOSITION Decision To Discharge    (Please note that portions of this note were completed with voice recognition software.   Efforts were made to edit the dictations but occasionally words are mis-transcribed.)     Kenny Garcia MD  03/16/23 2184

## 2023-03-14 NOTE — DISCHARGE INSTRUCTIONS
As discussed, the wounds on your legs appear to be consistent with diabetic ulcers, which can occur when you have diabetic neuropathy. They do not appear infected. Please call your podiatrist in the morning, to discuss your ER visit, and arrange a close follow-up appointment. In the meantime, you should gently cleanse the wounds daily, and apply a clean Adaptic and gauze dressing, to keep them covered and clean. Change the dressing and gently cleanse the wound with water and mild soap once per day. [address] [place] [address] will provide a nurse to assist with your wound care. You will hear from a  within [address] [place] [address]  Phone: [phone] Fax: [phone]    You may take 100 mg of gabapentin in the morning and at lunch, to help control your neuropathic pain during the day, if you wish, and continue taking 200 mg of gabapentin in the evening, as prescribed by your podiatrist.  Please discuss ongoing medication management with your podiatrist.    Please call your doctor, or return to the emergency department, if you have worsening symptoms or other concerns.